# Patient Record
Sex: FEMALE | Race: WHITE | NOT HISPANIC OR LATINO | Employment: OTHER | URBAN - METROPOLITAN AREA
[De-identification: names, ages, dates, MRNs, and addresses within clinical notes are randomized per-mention and may not be internally consistent; named-entity substitution may affect disease eponyms.]

---

## 2023-06-16 ENCOUNTER — TELEPHONE (OUTPATIENT)
Dept: PRIMARY CARE | Facility: CLINIC | Age: 73
End: 2023-06-16
Payer: MEDICARE

## 2023-06-16 DIAGNOSIS — Z12.31 BREAST CANCER SCREENING BY MAMMOGRAM: Primary | ICD-10-CM

## 2023-11-15 ENCOUNTER — LAB (OUTPATIENT)
Dept: LAB | Facility: LAB | Age: 73
End: 2023-11-15
Payer: MEDICARE

## 2023-11-15 ENCOUNTER — OFFICE VISIT (OUTPATIENT)
Dept: PRIMARY CARE | Facility: CLINIC | Age: 73
End: 2023-11-15
Payer: MEDICARE

## 2023-11-15 VITALS
HEART RATE: 76 BPM | HEIGHT: 63 IN | DIASTOLIC BLOOD PRESSURE: 78 MMHG | SYSTOLIC BLOOD PRESSURE: 117 MMHG | WEIGHT: 160.8 LBS | BODY MASS INDEX: 28.49 KG/M2

## 2023-11-15 DIAGNOSIS — E78.5 DYSLIPIDEMIA: Primary | ICD-10-CM

## 2023-11-15 DIAGNOSIS — E55.9 VITAMIN D DEFICIENCY DISEASE: ICD-10-CM

## 2023-11-15 DIAGNOSIS — R73.9 BORDERLINE HYPERGLYCEMIA: ICD-10-CM

## 2023-11-15 DIAGNOSIS — E78.5 DYSLIPIDEMIA: ICD-10-CM

## 2023-11-15 DIAGNOSIS — R79.89 ABNORMAL TSH: ICD-10-CM

## 2023-11-15 LAB
25(OH)D3 SERPL-MCNC: 40 NG/ML (ref 30–100)
ALBUMIN SERPL BCP-MCNC: 4.7 G/DL (ref 3.4–5)
ALP SERPL-CCNC: 50 U/L (ref 33–136)
ALT SERPL W P-5'-P-CCNC: 28 U/L (ref 7–45)
ANION GAP SERPL CALC-SCNC: 14 MMOL/L (ref 10–20)
AST SERPL W P-5'-P-CCNC: 26 U/L (ref 9–39)
BASOPHILS # BLD AUTO: 0.07 X10*3/UL (ref 0–0.1)
BASOPHILS NFR BLD AUTO: 1.3 %
BILIRUB SERPL-MCNC: 1 MG/DL (ref 0–1.2)
BUN SERPL-MCNC: 19 MG/DL (ref 6–23)
CALCIUM SERPL-MCNC: 10.4 MG/DL (ref 8.6–10.6)
CHLORIDE SERPL-SCNC: 106 MMOL/L (ref 98–107)
CHOLEST SERPL-MCNC: 169 MG/DL (ref 0–199)
CHOLESTEROL/HDL RATIO: 2.8
CO2 SERPL-SCNC: 28 MMOL/L (ref 21–32)
CREAT SERPL-MCNC: 0.68 MG/DL (ref 0.5–1.05)
EOSINOPHIL # BLD AUTO: 0.12 X10*3/UL (ref 0–0.4)
EOSINOPHIL NFR BLD AUTO: 2.2 %
ERYTHROCYTE [DISTWIDTH] IN BLOOD BY AUTOMATED COUNT: 13.6 % (ref 11.5–14.5)
EST. AVERAGE GLUCOSE BLD GHB EST-MCNC: 114 MG/DL
GFR SERPL CREATININE-BSD FRML MDRD: >90 ML/MIN/1.73M*2
GLUCOSE SERPL-MCNC: 97 MG/DL (ref 74–99)
HBA1C MFR BLD: 5.6 %
HCT VFR BLD AUTO: 42.1 % (ref 36–46)
HDLC SERPL-MCNC: 60 MG/DL
HGB BLD-MCNC: 13.8 G/DL (ref 12–16)
IMM GRANULOCYTES # BLD AUTO: 0.02 X10*3/UL (ref 0–0.5)
IMM GRANULOCYTES NFR BLD AUTO: 0.4 % (ref 0–0.9)
LDLC SERPL CALC-MCNC: 92 MG/DL
LYMPHOCYTES # BLD AUTO: 1.46 X10*3/UL (ref 0.8–3)
LYMPHOCYTES NFR BLD AUTO: 27.2 %
MCH RBC QN AUTO: 29.2 PG (ref 26–34)
MCHC RBC AUTO-ENTMCNC: 32.8 G/DL (ref 32–36)
MCV RBC AUTO: 89 FL (ref 80–100)
MONOCYTES # BLD AUTO: 0.4 X10*3/UL (ref 0.05–0.8)
MONOCYTES NFR BLD AUTO: 7.4 %
NEUTROPHILS # BLD AUTO: 3.3 X10*3/UL (ref 1.6–5.5)
NEUTROPHILS NFR BLD AUTO: 61.5 %
NON HDL CHOLESTEROL: 109 MG/DL (ref 0–149)
NRBC BLD-RTO: 0 /100 WBCS (ref 0–0)
PLATELET # BLD AUTO: 282 X10*3/UL (ref 150–450)
POTASSIUM SERPL-SCNC: 4.7 MMOL/L (ref 3.5–5.3)
PROT SERPL-MCNC: 7.1 G/DL (ref 6.4–8.2)
RBC # BLD AUTO: 4.72 X10*6/UL (ref 4–5.2)
SODIUM SERPL-SCNC: 143 MMOL/L (ref 136–145)
TRIGL SERPL-MCNC: 83 MG/DL (ref 0–149)
TSH SERPL-ACNC: 3.22 MIU/L (ref 0.44–3.98)
VLDL: 17 MG/DL (ref 0–40)
WBC # BLD AUTO: 5.4 X10*3/UL (ref 4.4–11.3)

## 2023-11-15 PROCEDURE — 80061 LIPID PANEL: CPT

## 2023-11-15 PROCEDURE — 82306 VITAMIN D 25 HYDROXY: CPT

## 2023-11-15 PROCEDURE — 80053 COMPREHEN METABOLIC PANEL: CPT

## 2023-11-15 PROCEDURE — G0439 PPPS, SUBSEQ VISIT: HCPCS | Performed by: INTERNAL MEDICINE

## 2023-11-15 PROCEDURE — 1170F FXNL STATUS ASSESSED: CPT | Performed by: INTERNAL MEDICINE

## 2023-11-15 PROCEDURE — 84443 ASSAY THYROID STIM HORMONE: CPT

## 2023-11-15 PROCEDURE — 83036 HEMOGLOBIN GLYCOSYLATED A1C: CPT

## 2023-11-15 PROCEDURE — 85025 COMPLETE CBC W/AUTO DIFF WBC: CPT

## 2023-11-15 PROCEDURE — 36415 COLL VENOUS BLD VENIPUNCTURE: CPT

## 2023-11-15 RX ORDER — ATORVASTATIN CALCIUM 20 MG/1
20 TABLET, FILM COATED ORAL DAILY
COMMUNITY
End: 2023-11-15 | Stop reason: SDUPTHER

## 2023-11-15 RX ORDER — ATORVASTATIN CALCIUM 20 MG/1
20 TABLET, FILM COATED ORAL DAILY
Qty: 90 TABLET | Refills: 3 | Status: SHIPPED | OUTPATIENT
Start: 2023-11-15 | End: 2024-11-14

## 2023-11-15 ASSESSMENT — ACTIVITIES OF DAILY LIVING (ADL)
DRESSING: INDEPENDENT
GROCERY_SHOPPING: INDEPENDENT
TAKING_MEDICATION: INDEPENDENT
DOING_HOUSEWORK: INDEPENDENT
BATHING: INDEPENDENT
MANAGING_FINANCES: INDEPENDENT

## 2023-11-15 ASSESSMENT — PATIENT HEALTH QUESTIONNAIRE - PHQ9
1. LITTLE INTEREST OR PLEASURE IN DOING THINGS: NOT AT ALL
2. FEELING DOWN, DEPRESSED OR HOPELESS: NOT AT ALL
SUM OF ALL RESPONSES TO PHQ9 QUESTIONS 1 AND 2: 0

## 2023-11-15 ASSESSMENT — ENCOUNTER SYMPTOMS
LOSS OF SENSATION IN FEET: 0
DEPRESSION: 0
OCCASIONAL FEELINGS OF UNSTEADINESS: 0

## 2023-11-15 NOTE — PROGRESS NOTES
Bridget Palomares is a 74 yo F presenting for CPE/MCR wellness exam.     Brother passed from CRC in interval.       DLD - atorva 20   Osteopenia - Ca/D   Epiretinal membrane disease - FU opthal       #HM   -cscope 6.22 - due 6.25   -dexa 12.6.22 - due 12.24  -mammo 7.14.23  -vax UTD   -labs due       SH        Gen Aox3 NAD well appearing   HEENT mmm pharynx clear no cervical LAD   Eyes sclerae clear   CV rrr nl s1/2 no m/r/g  Pulm CTAB no adventitious sounds   Ext warm dry no edema 2+ DP pulse

## 2023-11-27 ENCOUNTER — APPOINTMENT (OUTPATIENT)
Dept: PRIMARY CARE | Facility: CLINIC | Age: 73
End: 2023-11-27
Payer: MEDICARE

## 2024-02-23 ASSESSMENT — DERMATOLOGY QUALITY OF LIFE (QOL) ASSESSMENT
RATE HOW EMOTIONALLY BOTHERED YOU ARE BY YOUR SKIN PROBLEM (FOR EXAMPLE, WORRY, EMBARRASSMENT, FRUSTRATION): 0 - NEVER BOTHERED
RATE HOW BOTHERED YOU ARE BY SYMPTOMS OF YOUR SKIN PROBLEM (EG, ITCHING, STINGING BURNING, HURTING OR SKIN IRRITATION): 1
RATE HOW EMOTIONALLY BOTHERED YOU ARE BY YOUR SKIN PROBLEM (FOR EXAMPLE, WORRY, EMBARRASSMENT, FRUSTRATION): 0 - NEVER BOTHERED
WHAT SINGLE SKIN CONDITION LISTED BELOW IS THE PATIENT ANSWERING THE QUALITY-OF-LIFE ASSESSMENT QUESTIONS ABOUT: NONE OF THE ABOVE
RATE HOW BOTHERED YOU ARE BY SYMPTOMS OF YOUR SKIN PROBLEM (EG, ITCHING, STINGING BURNING, HURTING OR SKIN IRRITATION): 1
RATE HOW BOTHERED YOU ARE BY EFFECTS OF YOUR SKIN PROBLEMS ON YOUR ACTIVITIES (EG, GOING OUT, ACCOMPLISHING WHAT YOU WANT, WORK ACTIVITIES OR YOUR RELATIONSHIPS WITH OTHERS): 0 - NEVER BOTHERED
WHAT SINGLE SKIN CONDITION LISTED BELOW IS THE PATIENT ANSWERING THE QUALITY-OF-LIFE ASSESSMENT QUESTIONS ABOUT: NONE OF THE ABOVE
RATE HOW BOTHERED YOU ARE BY EFFECTS OF YOUR SKIN PROBLEMS ON YOUR ACTIVITIES (EG, GOING OUT, ACCOMPLISHING WHAT YOU WANT, WORK ACTIVITIES OR YOUR RELATIONSHIPS WITH OTHERS): 0 - NEVER BOTHERED

## 2024-02-23 ASSESSMENT — PATIENT GLOBAL ASSESSMENT (PGA): WHAT IS THE PGA: PATIENT GLOBAL ASSESSMENT:  2 - MILD

## 2024-02-26 ENCOUNTER — OFFICE VISIT (OUTPATIENT)
Dept: DERMATOLOGY | Facility: CLINIC | Age: 74
End: 2024-02-26
Payer: MEDICARE

## 2024-02-26 DIAGNOSIS — L57.0 ACTINIC KERATOSIS: ICD-10-CM

## 2024-02-26 DIAGNOSIS — L66.1 LICHEN PLANOPILARIS: Primary | ICD-10-CM

## 2024-02-26 PROCEDURE — 1159F MED LIST DOCD IN RCRD: CPT | Performed by: DERMATOLOGY

## 2024-02-26 PROCEDURE — 99204 OFFICE O/P NEW MOD 45 MIN: CPT | Performed by: DERMATOLOGY

## 2024-02-26 RX ORDER — CLOBETASOL PROPIONATE 0.46 MG/ML
SOLUTION TOPICAL 2 TIMES DAILY
Qty: 50 ML | Refills: 3 | Status: SHIPPED | OUTPATIENT
Start: 2024-02-26 | End: 2024-03-11

## 2024-02-26 NOTE — PROGRESS NOTES
Subjective   Bridget Palomares is a 73 y.o. female who presents for the following: Suspicious Skin Lesion.  Skin Lesion  She describes it as a bump, that is located on her scalp.  It was first noticed 2 weeks ago. It has been causing itching and is not changing and bleeding. Previously this spot has never been  treated .  Other spots that are concerning: none    Patient states she has a history of Sks.      Objective   Well appearing patient in no apparent distress; mood and affect are within normal limits.    A focused examination was performed including head, including the scalp, face, neck, nose, ears, eyelids, and lips. All findings within normal limits unless otherwise noted below.    Mid Frontal Scalp  Patient has perifollicular erythema and scale along the hairline.       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 The nature of the diagnosis was explained to patient. Will plan to treat with clobetasol solution BID x 2-4 weeks; counseled patient if no improvement, can consider adding ILK injections. Risks, benefits, side effects, alternatives and options were discussed with patient and the patient voiced understanding. Pt agrees with plan as above.         Related Procedures  Follow Up In Dermatology - Established Patient    Related Medications  clobetasol (Temovate) 0.05 % external solution  Apply topically 2 times a day for 14 days.    Follow up as above.

## 2024-03-08 ENCOUNTER — OFFICE VISIT (OUTPATIENT)
Dept: OPHTHALMOLOGY | Facility: CLINIC | Age: 74
End: 2024-03-08
Payer: MEDICARE

## 2024-03-08 DIAGNOSIS — H35.373 BILATERAL EPIRETINAL MEMBRANE: Primary | ICD-10-CM

## 2024-03-08 DIAGNOSIS — H35.363 PERIPHERAL DRUSEN OF BOTH EYES: ICD-10-CM

## 2024-03-08 PROCEDURE — 92134 CPTRZ OPH DX IMG PST SGM RTA: CPT | Mod: BILATERAL PROCEDURE

## 2024-03-08 PROCEDURE — 99213 OFFICE O/P EST LOW 20 MIN: CPT

## 2024-03-08 RX ORDER — ASPIRIN 81 MG/1
1 TABLET ORAL DAILY
COMMUNITY
Start: 2014-08-08

## 2024-03-08 RX ORDER — ERGOCALCIFEROL 1.25 MG/1
CAPSULE ORAL
COMMUNITY
Start: 2014-05-05

## 2024-03-08 ASSESSMENT — ENCOUNTER SYMPTOMS
NEUROLOGICAL NEGATIVE: 0
RESPIRATORY NEGATIVE: 0
CONSTITUTIONAL NEGATIVE: 0
HEMATOLOGIC/LYMPHATIC NEGATIVE: 0
CARDIOVASCULAR NEGATIVE: 0
GASTROINTESTINAL NEGATIVE: 0
MUSCULOSKELETAL NEGATIVE: 0
ENDOCRINE NEGATIVE: 0
ALLERGIC/IMMUNOLOGIC NEGATIVE: 0
PSYCHIATRIC NEGATIVE: 0
EYES NEGATIVE: 1

## 2024-03-08 ASSESSMENT — EXTERNAL EXAM - LEFT EYE: OS_EXAM: NORMAL

## 2024-03-08 ASSESSMENT — CONF VISUAL FIELD
OD_SUPERIOR_TEMPORAL_RESTRICTION: 0
OD_SUPERIOR_NASAL_RESTRICTION: 0
OD_NORMAL: 1
OS_SUPERIOR_NASAL_RESTRICTION: 0
METHOD: COUNTING FINGERS
OS_SUPERIOR_TEMPORAL_RESTRICTION: 0
OD_INFERIOR_NASAL_RESTRICTION: 0
OS_NORMAL: 1
OS_INFERIOR_NASAL_RESTRICTION: 0
OS_INFERIOR_TEMPORAL_RESTRICTION: 0
OD_INFERIOR_TEMPORAL_RESTRICTION: 0

## 2024-03-08 ASSESSMENT — VISUAL ACUITY
CORRECTION_TYPE: GLASSES
OS_CC: 20/25
OS_CC+: +2
METHOD: SNELLEN - LINEAR
OD_CC: 20/20

## 2024-03-08 ASSESSMENT — REFRACTION_WEARINGRX
OD_ADD: +2.50
OD_SPHERE: +0.50
OD_CYLINDER: -1.50
OS_AXIS: 080
OS_SPHERE: PLANO
SPECS_TYPE: PAL
OS_CYLINDER: -1.75
OD_AXIS: 080
OS_ADD: +2.50

## 2024-03-08 ASSESSMENT — TONOMETRY
IOP_METHOD: GOLDMANN APPLANATION
OS_IOP_MMHG: 17
OD_IOP_MMHG: 17

## 2024-03-08 ASSESSMENT — CUP TO DISC RATIO
OD_RATIO: .3
OS_RATIO: .3

## 2024-03-08 ASSESSMENT — SLIT LAMP EXAM - LIDS
COMMENTS: GOOD POSITION
COMMENTS: GOOD POSITION

## 2024-03-08 ASSESSMENT — EXTERNAL EXAM - RIGHT EYE: OD_EXAM: NORMAL

## 2024-03-08 NOTE — PROGRESS NOTES
Epiretinal membrane (ERM) of both eyesH35.373    Here today for annual follow up. Patient is reporting mild metamorphopsia OS only but states that it is stable and not affecting her ADLs.    Today on exam patient has stable ERMs, (OS > OD). Vision remains excellent at 20/20 OD and 20/25 OS.    OCT : 03/08/24     OD: ERM, abnromal foveal contour, inner reitnal thickening, EZ line preserved, (-) IRF/subretinal fluid (SRF)  OS: ERM (OS > OD), abnromal foveal contour, inner reitnal thickening, EZ line preserved, (-) IRF/subretinal fluid (SRF)    Plan  Will monitor for  now, discussed the need for potential surgery in the future if vision declines or symptoms worsen  RTC 6 months      #Peripheral Drusen  - no evidence of cnvm, few central drusen but not apparent on OCT  - not consistent with AMD, will monitor for now

## 2024-03-13 ENCOUNTER — OFFICE VISIT (OUTPATIENT)
Dept: DERMATOLOGY | Facility: CLINIC | Age: 74
End: 2024-03-13
Payer: MEDICARE

## 2024-03-13 DIAGNOSIS — L66.1 LICHEN PLANOPILARIS: ICD-10-CM

## 2024-03-13 DIAGNOSIS — L66.1 FRONTAL FIBROSING ALOPECIA: Primary | ICD-10-CM

## 2024-03-13 PROCEDURE — 1159F MED LIST DOCD IN RCRD: CPT | Performed by: DERMATOLOGY

## 2024-03-13 PROCEDURE — 1036F TOBACCO NON-USER: CPT | Performed by: DERMATOLOGY

## 2024-03-13 PROCEDURE — 99213 OFFICE O/P EST LOW 20 MIN: CPT | Performed by: DERMATOLOGY

## 2024-03-13 ASSESSMENT — DERMATOLOGY QUALITY OF LIFE (QOL) ASSESSMENT
ARE THERE EXCLUSIONS OR EXCEPTIONS FOR THE QUALITY OF LIFE ASSESSMENT: NO
DATE THE QUALITY-OF-LIFE ASSESSMENT WAS COMPLETED: 66912
RATE HOW BOTHERED YOU ARE BY EFFECTS OF YOUR SKIN PROBLEMS ON YOUR ACTIVITIES (EG, GOING OUT, ACCOMPLISHING WHAT YOU WANT, WORK ACTIVITIES OR YOUR RELATIONSHIPS WITH OTHERS): 0 - NEVER BOTHERED
RATE HOW BOTHERED YOU ARE BY SYMPTOMS OF YOUR SKIN PROBLEM (EG, ITCHING, STINGING BURNING, HURTING OR SKIN IRRITATION): 0 - NEVER BOTHERED
WHAT SINGLE SKIN CONDITION LISTED BELOW IS THE PATIENT ANSWERING THE QUALITY-OF-LIFE ASSESSMENT QUESTIONS ABOUT: NONE OF THE ABOVE
RATE HOW EMOTIONALLY BOTHERED YOU ARE BY YOUR SKIN PROBLEM (FOR EXAMPLE, WORRY, EMBARRASSMENT, FRUSTRATION): 0 - NEVER BOTHERED

## 2024-03-13 ASSESSMENT — DERMATOLOGY PATIENT ASSESSMENT
DO YOU HAVE IRREGULAR MENSTRUAL CYCLES: NO
DO YOU USE SUNSCREEN: DAILY
ARE YOU ON BIRTH CONTROL: NO
WHERE ARE THESE NEW OR CHANGING LESIONS LOCATED: FOREHEAD
DO YOU HAVE ANY NEW OR CHANGING LESIONS: NO
ARE YOU AN ORGAN TRANSPLANT RECIPIENT: NO
HAVE YOU HAD OR DO YOU HAVE VASCULAR DISEASE: NO
ARE YOU TRYING TO GET PREGNANT: NO
HAVE YOU HAD OR DO YOU HAVE A STAPH INFECTION: NO
DO YOU USE A TANNING BED: NO

## 2024-03-13 ASSESSMENT — PATIENT GLOBAL ASSESSMENT (PGA): PATIENT GLOBAL ASSESSMENT: PATIENT GLOBAL ASSESSMENT:  1 - CLEAR

## 2024-03-13 ASSESSMENT — ITCH NUMERIC RATING SCALE: HOW SEVERE IS YOUR ITCHING?: 0

## 2024-03-13 NOTE — PROGRESS NOTES
Subjective   Bridget Palomares is a 73 y.o. female who presents for the following: Lichen Planopilaris.    Lichen Planopilaris/frontal fibrosing alopecia  Patient reports here for follow up on FFA on her mid frontal scalp. She has done the clobetasol twice daily for 2 weeks with resolution of her symptoms. She last used it on Monday. She denies itch, pain, burning of the scalp at this time. She has not noticed hair loss elsewhere      Objective   Well appearing patient in no apparent distress; mood and affect are within normal limits.    A focused examination was performed including scalp, face. All findings within normal limits unless otherwise noted below.    Scalp  Erythema and perifollicular scale, primarily in the frontal midline scalp; miniaturization is noted. There is scarring present in the frontal scalp.      Assessment/Plan   Frontal fibrosing alopecia  Scalp    Frontal fibrosing alopcia  - Diagnosis and treatment options reviewed including topical steroids, ILK were reviewed; HCQ, low-dose oral naltrexone, oral minoxidil, or oral anti-androgens could be considered in the future if not under adequate control  - This is a scarring alopecia, and the goal of treatment is to prevent further loss of hair. There is potential for some regrowth with treatment  - Recommend to continue clobetasol solution, decreasing use to once daily to affected areas frontal scalp for 3 months; risks and benefits of topical steroids including thinning of the skin, more prominent blood vessels reviewed    Related Procedures  Follow Up In Dermatology - Established Patient    Lichen planopilaris    Related Procedures  Follow Up In Dermatology - Established Patient    3/13/2024 11:01 AM  Raya Guillaume MD  Dermatology Resident, PGY-4     I saw and evaluated the patient. I personally obtained the key and critical portions of the history and physical exam or was physically present for key and critical portions performed by the  student/resident. I reviewed the student/resident's documentation and discussed the patient with the student/resident. I was present for the entirety of any procedure(s). I agree with the student/resident's medical decision making as documented in the note.

## 2024-06-20 ENCOUNTER — APPOINTMENT (OUTPATIENT)
Dept: DERMATOLOGY | Facility: CLINIC | Age: 74
End: 2024-06-20
Payer: MEDICARE

## 2024-06-20 DIAGNOSIS — L81.4 LENTIGO: ICD-10-CM

## 2024-06-20 DIAGNOSIS — D18.01 HEMANGIOMA OF SKIN: ICD-10-CM

## 2024-06-20 DIAGNOSIS — L82.1 SEBORRHEIC KERATOSIS: ICD-10-CM

## 2024-06-20 DIAGNOSIS — Z12.83 ENCOUNTER FOR SCREENING FOR MALIGNANT NEOPLASM OF SKIN: ICD-10-CM

## 2024-06-20 DIAGNOSIS — L66.1 FRONTAL FIBROSING ALOPECIA: Primary | ICD-10-CM

## 2024-06-20 DIAGNOSIS — D22.9 MELANOCYTIC NEVUS, UNSPECIFIED LOCATION: ICD-10-CM

## 2024-06-20 PROCEDURE — 1159F MED LIST DOCD IN RCRD: CPT | Performed by: DERMATOLOGY

## 2024-06-20 PROCEDURE — 1036F TOBACCO NON-USER: CPT | Performed by: DERMATOLOGY

## 2024-06-20 PROCEDURE — 99213 OFFICE O/P EST LOW 20 MIN: CPT | Performed by: DERMATOLOGY

## 2024-06-20 RX ORDER — CLOBETASOL PROPIONATE 0.46 MG/ML
SOLUTION TOPICAL DAILY
Qty: 50 ML | Refills: 11 | Status: SHIPPED | OUTPATIENT
Start: 2024-06-20 | End: 2024-07-04

## 2024-06-20 ASSESSMENT — DERMATOLOGY QUALITY OF LIFE (QOL) ASSESSMENT
DATE THE QUALITY-OF-LIFE ASSESSMENT WAS COMPLETED: 67011
WHAT SINGLE SKIN CONDITION LISTED BELOW IS THE PATIENT ANSWERING THE QUALITY-OF-LIFE ASSESSMENT QUESTIONS ABOUT: ALOPECIA
RATE HOW BOTHERED YOU ARE BY SYMPTOMS OF YOUR SKIN PROBLEM (EG, ITCHING, STINGING BURNING, HURTING OR SKIN IRRITATION): 0 - NEVER BOTHERED
RATE HOW EMOTIONALLY BOTHERED YOU ARE BY YOUR SKIN PROBLEM (FOR EXAMPLE, WORRY, EMBARRASSMENT, FRUSTRATION): 0 - NEVER BOTHERED
RATE HOW BOTHERED YOU ARE BY EFFECTS OF YOUR SKIN PROBLEMS ON YOUR ACTIVITIES (EG, GOING OUT, ACCOMPLISHING WHAT YOU WANT, WORK ACTIVITIES OR YOUR RELATIONSHIPS WITH OTHERS): 0 - NEVER BOTHERED
ARE THERE EXCLUSIONS OR EXCEPTIONS FOR THE QUALITY OF LIFE ASSESSMENT: NO

## 2024-06-20 ASSESSMENT — PATIENT GLOBAL ASSESSMENT (PGA): PATIENT GLOBAL ASSESSMENT: PATIENT GLOBAL ASSESSMENT:  2 - MILD

## 2024-06-20 ASSESSMENT — ITCH NUMERIC RATING SCALE: HOW SEVERE IS YOUR ITCHING?: 0

## 2024-06-20 ASSESSMENT — DERMATOLOGY PATIENT ASSESSMENT
HAVE YOU HAD OR DO YOU HAVE A STAPH INFECTION: NO
DO YOU USE A TANNING BED: NO
ARE YOU AN ORGAN TRANSPLANT RECIPIENT: NO
DO YOU USE SUNSCREEN: DAILY
DO YOU HAVE ANY NEW OR CHANGING LESIONS: NO
HAVE YOU HAD OR DO YOU HAVE VASCULAR DISEASE: NO
DO YOU HAVE IRREGULAR MENSTRUAL CYCLES: NO
ARE YOU ON BIRTH CONTROL: NO
ARE YOU TRYING TO GET PREGNANT: NO

## 2024-06-20 NOTE — PROGRESS NOTES
Subjective     Bridget Palomares is a 73 y.o. female who presents for the following: Skin Check and Frontal Fibrosing Alopecia.     Skin Cancer Screening  She has a history of moderate sun exposure. She is in the sun occasionally. She uses sunscreen occasionally. She reports no skin symptoms. Her moles are not changing.    Spots that concern her: None    Frontal Fibrosing Alopecia  Patient presents for follow up on FFA on her mid frontal scalp. She has been using clobetasol once daily to affected areas and reports good response. She did notice a small flare.    Review of Systems:  No other skin or systemic complaints other than what is documented elsewhere in the note.    The following portions of the chart were reviewed this encounter and updated as appropriate:       Skin Cancer History  No skin cancer on file.    Specialty Problems    None    Past Medical History:  Bridget Palomares  has a past medical history of Acne (as a teen), Acute pharyngitis, unspecified (07/06/2018), Acute pharyngitis, unspecified (07/06/2018), Acute pharyngitis, unspecified (07/06/2018), Acute pharyngitis, unspecified (07/06/2018), Benign neoplasm of colon, unspecified (07/06/2018), Benign neoplasm of colon, unspecified (07/06/2018), Combined forms of age-related cataract, left eye (07/06/2018), Combined forms of age-related cataract, right eye (07/06/2018), Cough, unspecified (07/06/2018), Cough, unspecified (07/06/2018), Disorder of the skin and subcutaneous tissue, unspecified (07/06/2018), Disorder of the skin and subcutaneous tissue, unspecified (07/06/2018), Encounter for immunization (07/06/2018), Encounter for immunization (07/06/2018), Encounter for screening for malignant neoplasm of cervix (07/06/2018), Encounter for screening for malignant neoplasm of cervix (07/06/2018), Encounter for screening for malignant neoplasm of colon (07/06/2018), Encounter for screening for malignant neoplasm of colon (07/06/2018), Encounter  for screening mammogram for malignant neoplasm of breast (07/06/2018), Encounter for screening mammogram for malignant neoplasm of breast (07/06/2018), Epiretinal membrane (ERM), bilateral, Hyperlipidemia, unspecified (07/06/2018), Hyperlipidemia, unspecified (07/06/2018), Malignant neoplasm of uterus, part unspecified (Multi), Other vitreous opacities, unspecified eye (09/23/2014), Pain in left shoulder (07/06/2018), Pain in left shoulder (07/06/2018), Personal history of other endocrine, nutritional and metabolic disease, Precordial pain (07/06/2018), Precordial pain (07/06/2018), PVD (posterior vitreous detachment), right eye, Unspecified disorder of refraction (07/06/2018), Unspecified injury of head, initial encounter (07/06/2018), Unspecified injury of head, initial encounter (07/06/2018), Vitamin D deficiency, unspecified (07/06/2018), Vitamin D deficiency, unspecified (07/06/2018), and Vitreous degeneration, right eye (07/06/2018).    Past Surgical History:  Bridget Paolmares  has a past surgical history that includes Other surgical history (09/23/2014); Hysterectomy (09/23/2014); Tonsillectomy (09/23/2014); Colonoscopy w/ polypectomy (09/12/2015); and Other surgical history (09/12/2015).    Family History:  Patient family history includes Acne in her brother; Arthritis in her father and mother; Cancer in her brother, mother, paternal grandfather, and paternal grandmother; Diabetes in her father and father's brother; Hypertension in her brother, father, and mother; Skin cancer in her brother and mother; Stroke in her brother, father, and paternal grandfather.         Objective   Well appearing patient in no apparent distress; mood and affect are within normal limits.    A full examination was performed including scalp, head, eyes, ears, nose, lips, neck, chest, axillae, abdomen, back, buttocks, bilateral upper extremities, bilateral lower extremities, hands, feet, fingers, toes, fingernails, and toenails.  All findings within normal limits unless otherwise noted below.    Assessment/Plan   1. Encounter for screening for malignant neoplasm of skin    2. Hemangioma of skin  Scattered cherry-red papule(s).    3. Melanocytic nevus, unspecified location  All nevi were symmetric brown macules without atypia on dermoscopy.     The ABCDEs of melanoma and warning signs of non-melanoma skin cancer were discussed with patient and patient expressed understanding. Sun protection and use of at least SPF 30 discussed with patient. Pt instructed to reapply every 2 hours.     4. Lentigo  Scattered tan macules in sun-exposed areas.    The ABCDEs of melanoma and warning signs of non-melanoma skin cancer were discussed with patient and patient expressed understanding. Sun protection and use of at least SPF 30 discussed with patient. Pt instructed to reapply every 2 hours.     5. Seborrheic keratosis  Stuck on verrucous, tan-brown papules and plaques.      6. Frontal fibrosing alopecia  Scalp  Patient is well controlled on exam today.     Plan to continue clobetasol and recheck in 3 months; pt to call if flaring and we will consider ILK injections. Risks, benefits, side effects, alternatives and options were discussed with patient and the patient voiced understanding. Pt agrees with plan as above.       Related Procedures  Follow Up In Dermatology - Established Patient    Related Medications  clobetasol (Temovate) 0.05 % external solution  Apply topically once daily for 14 days. Then continuously/as needed to scalp      Follow up 3-4 months or sooner as needed

## 2024-09-12 ENCOUNTER — APPOINTMENT (OUTPATIENT)
Dept: PODIATRY | Facility: CLINIC | Age: 74
End: 2024-09-12
Payer: MEDICARE

## 2024-09-12 DIAGNOSIS — L60.3 ONYCHODYSTROPHY: ICD-10-CM

## 2024-09-12 DIAGNOSIS — B35.1 ONYCHOMYCOSIS: Primary | ICD-10-CM

## 2024-09-12 PROCEDURE — 99203 OFFICE O/P NEW LOW 30 MIN: CPT | Performed by: PODIATRIST

## 2024-09-12 PROCEDURE — 1159F MED LIST DOCD IN RCRD: CPT | Performed by: PODIATRIST

## 2024-09-12 NOTE — PROGRESS NOTES
"History Of Present Illness  Bridget Palomares \"Bridget Abdi\" is a 74 y.o. female presenting with chief complaint of: transition of care for right great toenail problem.     PCP Helen Patel MD  To establish 10/2024     Past Medical History  She has a past medical history of Acne (as a teen), Acute pharyngitis, unspecified (07/06/2018), Acute pharyngitis, unspecified (07/06/2018), Acute pharyngitis, unspecified (07/06/2018), Acute pharyngitis, unspecified (07/06/2018), Benign neoplasm of colon, unspecified (07/06/2018), Benign neoplasm of colon, unspecified (07/06/2018), Combined forms of age-related cataract, left eye (07/06/2018), Combined forms of age-related cataract, right eye (07/06/2018), Cough, unspecified (07/06/2018), Cough, unspecified (07/06/2018), Disorder of the skin and subcutaneous tissue, unspecified (07/06/2018), Disorder of the skin and subcutaneous tissue, unspecified (07/06/2018), Encounter for immunization (07/06/2018), Encounter for immunization (07/06/2018), Encounter for screening for malignant neoplasm of cervix (07/06/2018), Encounter for screening for malignant neoplasm of cervix (07/06/2018), Encounter for screening for malignant neoplasm of colon (07/06/2018), Encounter for screening for malignant neoplasm of colon (07/06/2018), Encounter for screening mammogram for malignant neoplasm of breast (07/06/2018), Encounter for screening mammogram for malignant neoplasm of breast (07/06/2018), Epiretinal membrane (ERM), bilateral, Hyperlipidemia, unspecified (07/06/2018), Hyperlipidemia, unspecified (07/06/2018), Malignant neoplasm of uterus, part unspecified (Multi), Other vitreous opacities, unspecified eye (09/23/2014), Pain in left shoulder (07/06/2018), Pain in left shoulder (07/06/2018), Personal history of other endocrine, nutritional and metabolic disease, Precordial pain (07/06/2018), Precordial pain (07/06/2018), PVD (posterior vitreous detachment), right eye, Unspecified disorder of " refraction (07/06/2018), Unspecified injury of head, initial encounter (07/06/2018), Unspecified injury of head, initial encounter (07/06/2018), Vitamin D deficiency, unspecified (07/06/2018), Vitamin D deficiency, unspecified (07/06/2018), and Vitreous degeneration, right eye (07/06/2018).    Surgical History  She has a past surgical history that includes Other surgical history (09/23/2014); Hysterectomy (09/23/2014); Tonsillectomy (09/23/2014); Colonoscopy w/ polypectomy (09/12/2015); and Other surgical history (09/12/2015).     Social History  She reports that she has never smoked. She has never used smokeless tobacco. She reports current alcohol use of about 2.0 standard drinks of alcohol per week. She reports that she does not use drugs.    Family History  Family History   Problem Relation Name Age of Onset    Arthritis Mother Maru Villar     Cancer Mother Maru Villar     Hypertension Mother Maru Villar     Skin cancer Mother Maru Villar     Diabetes Father Joseph Villar     Arthritis Father Joseph Villar     Hypertension Father Joseph Villar     Stroke Father Joseph Villar     Acne Brother Jose Marimarychuy     Cancer Brother Jose Villar     Hypertension Brother Jose Villar     Stroke Brother Jose Villar     Skin cancer Brother Jose Villar     Diabetes Father's Brother Nate Villar     Cancer Paternal Grandmother Ana Villar     Cancer Paternal Grandfather Joseph Villar     Stroke Paternal Grandfather Joseph Villar         Allergies  Tomato    Medications  Current Outpatient Medications   Medication Sig Dispense Refill    aspirin 81 mg EC tablet Take 1 tablet (81 mg) by mouth once daily.      atorvastatin (Lipitor) 20 mg tablet Take 1 tablet (20 mg) by mouth once daily. 90 tablet 3    cholecalciferol, vitamin D3, (VITAMIN D3 ORAL) Take by mouth.      COQ10, UBIQUINOL, ORAL       multivit-mineral-iron-lutein tablet       omega-3 fatty acids (FISH OIL CONCENTRATE ORAL) Take by mouth.       No  current facility-administered medications for this visit.       Review of Systems    REVIEW OF SYSTEMS  GENERAL:  Negative for malaise, significant weight loss, fever  CARDIOVASCULAR: leg swelling   MUSCULOSKELETAL:  Negative for joint pain or swelling, back pain, and muscle pain.  SKIN:  Negative for lesions, rash, and itching  PSYCH:  Negative for sleep disturbance, mood disorder and recent psychosocial stressors  NEURO: Negative, denies any burning, tingling or numbness     Objective:   Vasc: DP and PT pulses are palpable bilateral.  CFT is less than 3 seconds bilateral.  Skin temperature is warm to cool proximal to distal bilateral.      Neuro:  Light touch is intact to the foot bilateral.  There is no clonus noted.  The hallux is downgoing bilateral.      Derm: First toenail left is completely dystrophic thickened with subungual debris.  Skin is supple with normal texture and turgor noted.  Webspaces are clean, dry and intact bilateral.  There are no hyperkeratoses, ulcerations, verruca or other lesions noted.      Ortho: Muscle strength is 5/5 for all pedal groups tested.  Ankle joint, subtalar joint, 1st MPJ and lesser MPJ ROM is full and without pain or crepitus.  The foot type is rectus bilateral off weight bearing.  There are no structural deformities noted.    Assessment/Plan     Diagnoses and all orders for this visit:  Onychomycosis  Onychodystrophy    Nail was aggressively debrided.  Warned patient that she may never have a normal-looking nail.  At this time she can do Kerasal to soften nail and perhaps protect new nail growth.

## 2024-09-13 ENCOUNTER — APPOINTMENT (OUTPATIENT)
Dept: OPHTHALMOLOGY | Facility: CLINIC | Age: 74
End: 2024-09-13
Payer: MEDICARE

## 2024-09-13 DIAGNOSIS — H35.373 BILATERAL EPIRETINAL MEMBRANE: Primary | ICD-10-CM

## 2024-09-13 DIAGNOSIS — H35.363 PERIPHERAL DRUSEN OF BOTH EYES: ICD-10-CM

## 2024-09-13 ASSESSMENT — ENCOUNTER SYMPTOMS
GASTROINTESTINAL NEGATIVE: 0
EYES NEGATIVE: 0
ALLERGIC/IMMUNOLOGIC NEGATIVE: 0
HEMATOLOGIC/LYMPHATIC NEGATIVE: 0
NEUROLOGICAL NEGATIVE: 0
CARDIOVASCULAR NEGATIVE: 0
MUSCULOSKELETAL NEGATIVE: 0
ENDOCRINE NEGATIVE: 0
PSYCHIATRIC NEGATIVE: 0
RESPIRATORY NEGATIVE: 0
CONSTITUTIONAL NEGATIVE: 0

## 2024-09-13 ASSESSMENT — REFRACTION_WEARINGRX
OS_CYLINDER: -1.75
OS_ADD: +2.50
OS_SPHERE: PLANO
OD_AXIS: 080
OD_SPHERE: +0.50
OD_CYLINDER: -1.50
OD_ADD: +2.50
OS_AXIS: 080
SPECS_TYPE: PAL

## 2024-09-13 ASSESSMENT — EXTERNAL EXAM - LEFT EYE: OS_EXAM: NORMAL

## 2024-09-13 ASSESSMENT — TONOMETRY
IOP_METHOD: GOLDMANN APPLANATION
OD_IOP_MMHG: 16
OS_IOP_MMHG: 16

## 2024-09-13 ASSESSMENT — VISUAL ACUITY
METHOD: SNELLEN - LINEAR
OS_CC: 20/25+2
CORRECTION_TYPE: GLASSES
OD_CC: 20/20

## 2024-09-13 ASSESSMENT — SLIT LAMP EXAM - LIDS
COMMENTS: GOOD POSITION
COMMENTS: GOOD POSITION

## 2024-09-13 ASSESSMENT — CUP TO DISC RATIO
OD_RATIO: .3
OS_RATIO: .3

## 2024-09-13 ASSESSMENT — EXTERNAL EXAM - RIGHT EYE: OD_EXAM: NORMAL

## 2024-09-13 NOTE — PROGRESS NOTES
Epiretinal membrane (ERM) of both eyesH35.373  - Here today for annual follow up. Patient is reporting mild metamorphopsia OS only but states that it is stable and not affecting her ADLs.    - Today on exam patient has stable ERMs, (OS > OD). Vision remains excellent at 20/20 OD and 20/25 OS.    OCT : 09/13/24   - OD: ERM, abnromal foveal contour, inner reitnal thickening, EZ line preserved, (-) IRF/subretinal fluid (SRF); stable   - OS: ERM (OS > OD), abnromal foveal contour, inner reitnal thickening, EZ line preserved, (-) IRF/subretinal fluid (SRF): stable    Plan  - Will monitor for  now, discussed the need for potential surgery in the future if vision declines or symptoms worsen  - RTC 6 months      #Peripheral Drusen  - no evidence of cnvm, few central drusen but not apparent on OCT  - not consistent with AMD, will monitor for now

## 2024-10-03 ENCOUNTER — APPOINTMENT (OUTPATIENT)
Dept: PRIMARY CARE | Facility: CLINIC | Age: 74
End: 2024-10-03
Payer: MEDICARE

## 2024-10-03 VITALS
OXYGEN SATURATION: 99 % | WEIGHT: 155.8 LBS | HEART RATE: 70 BPM | DIASTOLIC BLOOD PRESSURE: 76 MMHG | BODY MASS INDEX: 27.61 KG/M2 | HEIGHT: 63 IN | SYSTOLIC BLOOD PRESSURE: 114 MMHG

## 2024-10-03 DIAGNOSIS — C55 MALIGNANT NEOPLASM OF UTERUS, UNSPECIFIED SITE (MULTI): ICD-10-CM

## 2024-10-03 DIAGNOSIS — Z80.0 FAMILY HISTORY OF COLON CANCER: ICD-10-CM

## 2024-10-03 DIAGNOSIS — Z00.00 HEALTH CARE MAINTENANCE: ICD-10-CM

## 2024-10-03 DIAGNOSIS — Z76.89 ENCOUNTER TO ESTABLISH CARE: Primary | ICD-10-CM

## 2024-10-03 DIAGNOSIS — M85.80 OSTEOPENIA, UNSPECIFIED LOCATION: ICD-10-CM

## 2024-10-03 DIAGNOSIS — Z78.0 POST-MENOPAUSAL: ICD-10-CM

## 2024-10-03 DIAGNOSIS — Z12.31 ENCOUNTER FOR SCREENING MAMMOGRAM FOR MALIGNANT NEOPLASM OF BREAST: ICD-10-CM

## 2024-10-03 DIAGNOSIS — E55.9 VITAMIN D DEFICIENCY: ICD-10-CM

## 2024-10-03 DIAGNOSIS — E78.5 HYPERLIPIDEMIA, UNSPECIFIED HYPERLIPIDEMIA TYPE: ICD-10-CM

## 2024-10-03 PROCEDURE — 1159F MED LIST DOCD IN RCRD: CPT | Performed by: INTERNAL MEDICINE

## 2024-10-03 PROCEDURE — 99214 OFFICE O/P EST MOD 30 MIN: CPT | Performed by: INTERNAL MEDICINE

## 2024-10-03 PROCEDURE — 3008F BODY MASS INDEX DOCD: CPT | Performed by: INTERNAL MEDICINE

## 2024-10-03 RX ORDER — CLOBETASOL PROPIONATE 0.5 MG/ML
SOLUTION TOPICAL 2 TIMES DAILY
COMMUNITY

## 2024-10-03 ASSESSMENT — PATIENT HEALTH QUESTIONNAIRE - PHQ9
2. FEELING DOWN, DEPRESSED OR HOPELESS: NOT AT ALL
SUM OF ALL RESPONSES TO PHQ9 QUESTIONS 1 AND 2: 0
1. LITTLE INTEREST OR PLEASURE IN DOING THINGS: NOT AT ALL

## 2024-10-03 NOTE — PROGRESS NOTES
"Subjective   Patient ID: [id number]     Bridget Palomares \"Bridget Abdi\" is a 74 y.o. female who presents for New Patient Visit.    Current Outpatient Medications:     aspirin 81 mg EC tablet, Take 1 tablet (81 mg) by mouth once daily., Disp: , Rfl:     atorvastatin (Lipitor) 20 mg tablet, Take 1 tablet (20 mg) by mouth once daily., Disp: 90 tablet, Rfl: 3    cholecalciferol, vitamin D3, (VITAMIN D3 ORAL), Take by mouth., Disp: , Rfl:     clobetasol (Temovate) 0.05 % external solution, Apply topically 2 times a day., Disp: , Rfl:     COQ10, UBIQUINOL, ORAL, , Disp: , Rfl:     multivit-mineral-iron-lutein tablet, , Disp: , Rfl:     omega-3 fatty acids (FISH OIL CONCENTRATE ORAL), Take by mouth., Disp: , Rfl:   HPI  74-year-old patient presented to the office today to establish care.  Patient is due for her Medicare wellness and her annual exam next month.  She is known to have history of uterine cancer and history of hyperlipidemia.  She is doing great she has no specific concerns or complaints today she just wants to get the blood work requested and to get orders placed for mammogram and DEXA scan.  Review of system was reviewed all normal except what is noted in HPI   Past Medical History:   Diagnosis Date    Acne as a teen    Acute pharyngitis, unspecified 07/06/2018    Pharyngitis    Acute pharyngitis, unspecified 07/06/2018    Sore throat    Acute pharyngitis, unspecified 07/06/2018    Sore throat    Acute pharyngitis, unspecified 07/06/2018    Pharyngitis    Benign neoplasm of colon, unspecified 07/06/2018    Tubular adenoma of colon    Benign neoplasm of colon, unspecified 07/06/2018    Tubular adenoma of colon    Combined forms of age-related cataract, left eye 07/06/2018    Combined form of age-related cataract, left eye    Combined forms of age-related cataract, right eye 07/06/2018    Combined form of age-related cataract, right eye    Cough, unspecified 07/06/2018    Cough    Cough, unspecified 07/06/2018    " Cough    Disorder of the skin and subcutaneous tissue, unspecified 07/06/2018    Changing skin lesion    Disorder of the skin and subcutaneous tissue, unspecified 07/06/2018    Changing skin lesion    Encounter for immunization 07/06/2018    Need for pneumococcal vaccination    Encounter for immunization 07/06/2018    Need for pneumococcal vaccination    Encounter for screening for malignant neoplasm of cervix 07/06/2018    Cervical cancer screening    Encounter for screening for malignant neoplasm of cervix 07/06/2018    Cervical cancer screening    Encounter for screening for malignant neoplasm of colon 07/06/2018    Colon cancer screening    Encounter for screening for malignant neoplasm of colon 07/06/2018    Colon cancer screening    Encounter for screening mammogram for malignant neoplasm of breast 07/06/2018    Visit for screening mammogram    Encounter for screening mammogram for malignant neoplasm of breast 07/06/2018    Visit for screening mammogram    Epiretinal membrane (ERM), bilateral     Hyperlipidemia, unspecified 07/06/2018    Hyperlipidemia    Hyperlipidemia, unspecified 07/06/2018    Hyperlipidemia    Malignant neoplasm of uterus, part unspecified (Multi)     Mucinous adenocarcinoma of uterus    Other vitreous opacities, unspecified eye 09/23/2014    Floaters    Pain in left shoulder 07/06/2018    Left shoulder pain    Pain in left shoulder 07/06/2018    Left shoulder pain    Personal history of other endocrine, nutritional and metabolic disease     History of hypercholesterolemia    Precordial pain 07/06/2018    Chest pain, precordial    Precordial pain 07/06/2018    Chest pain, precordial    PVD (posterior vitreous detachment), right eye     Unspecified disorder of refraction 07/06/2018    Refraction error    Unspecified injury of head, initial encounter 07/06/2018    Acute head injury, initial encounter    Unspecified injury of head, initial encounter 07/06/2018    Acute head injury, initial  "encounter    Vitamin D deficiency, unspecified 07/06/2018    Vitamin D deficiency    Vitamin D deficiency, unspecified 07/06/2018    Vitamin D deficiency    Vitreous degeneration, right eye 07/06/2018    PVD (posterior vitreous detachment), right eye      Objective   /76 (BP Location: Left arm, Patient Position: Sitting, BP Cuff Size: Adult)   Pulse 70   Ht 1.588 m (5' 2.5\")   Wt 70.7 kg (155 lb 12.8 oz)   SpO2 99%   BMI 28.04 kg/m²      Physical Exam  Constitutional:       Appearance: Normal appearance.   Cardiovascular:      Rate and Rhythm: Normal rate and regular rhythm.      Pulses: Normal pulses.      Heart sounds: Normal heart sounds.   Pulmonary:      Effort: Pulmonary effort is normal.      Breath sounds: Normal breath sounds.   Neurological:      Mental Status: She is alert.         Assessment/Plan   Problem List Items Addressed This Visit    None  Visit Diagnoses       Encounter for screening mammogram for malignant neoplasm of breast            74-year-old patient with the following issues.    1.  Hyperlipidemia on atorvastatin lipid profile results are pending dose adjustment will take place if needed.    2.  Lab work was requested to prepare the patient for her annual exam and Medicare wellness as well as mammogram and DEXA scan.    No other active issues or concerns during this visit I will see the patient back in the office next month and sooner if needed.    Helen Morse MD   "

## 2024-10-09 ENCOUNTER — APPOINTMENT (OUTPATIENT)
Dept: DERMATOLOGY | Facility: CLINIC | Age: 74
End: 2024-10-09
Payer: MEDICARE

## 2024-10-09 DIAGNOSIS — L66.12 FRONTAL FIBROSING ALOPECIA: Primary | ICD-10-CM

## 2024-10-09 PROCEDURE — 1159F MED LIST DOCD IN RCRD: CPT | Performed by: DERMATOLOGY

## 2024-10-09 PROCEDURE — 1036F TOBACCO NON-USER: CPT | Performed by: DERMATOLOGY

## 2024-10-09 PROCEDURE — 99213 OFFICE O/P EST LOW 20 MIN: CPT | Performed by: DERMATOLOGY

## 2024-10-09 NOTE — PROGRESS NOTES
Subjective     Bridget Palomares is a 74 y.o. 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 Currently using Clobetasol Solution. ).     Alopecia  She complains of hair loss. The hair loss is localized in distribution, with onset approximately 1 year ago. She describes symptoms of hair thinning. She denies hair breaking, scalp itch, scalp pain, scalp rash/ lesions, scalp redness, scalp scaling, and scalp tenderness. She does not have family history of hair loss. She does not have dietary restrictions. She does not wear a high tension hair style. She had no serious medical illnesses or major weight loss during time of hair loss.         Review of Systems:  No other skin or systemic complaints other than what is documented elsewhere in the note.    The following portions of the chart were reviewed this encounter and updated as appropriate:       Skin Cancer History  No skin cancer on file.    Specialty Problems    None    Past Medical History:  Bridget Palomares  has a past medical history of Acne (as a teen), Acute pharyngitis, unspecified (07/06/2018), Acute pharyngitis, unspecified (07/06/2018), Acute pharyngitis, unspecified (07/06/2018), Acute pharyngitis, unspecified (07/06/2018), Benign neoplasm of colon, unspecified (07/06/2018), Benign neoplasm of colon, unspecified (07/06/2018), Combined forms of age-related cataract, left eye (07/06/2018), Combined forms of age-related cataract, right eye (07/06/2018), Cough, unspecified (07/06/2018), Cough, unspecified (07/06/2018), Disorder of the skin and subcutaneous tissue, unspecified (07/06/2018), Disorder of the skin and subcutaneous tissue, unspecified (07/06/2018), Encounter for immunization (07/06/2018), Encounter for immunization (07/06/2018), Encounter for screening for malignant neoplasm of cervix (07/06/2018), Encounter for screening for malignant neoplasm of cervix  (07/06/2018), Encounter for screening for malignant neoplasm of colon (07/06/2018), Encounter for screening for malignant neoplasm of colon (07/06/2018), Encounter for screening mammogram for malignant neoplasm of breast (07/06/2018), Encounter for screening mammogram for malignant neoplasm of breast (07/06/2018), Epiretinal membrane (ERM), bilateral, Hyperlipidemia, unspecified (07/06/2018), Hyperlipidemia, unspecified (07/06/2018), Malignant neoplasm of uterus, part unspecified (Multi), Other vitreous opacities, unspecified eye (09/23/2014), Pain in left shoulder (07/06/2018), Pain in left shoulder (07/06/2018), Personal history of other endocrine, nutritional and metabolic disease, Precordial pain (07/06/2018), Precordial pain (07/06/2018), PVD (posterior vitreous detachment), right eye, Unspecified disorder of refraction (07/06/2018), Unspecified injury of head, initial encounter (07/06/2018), Unspecified injury of head, initial encounter (07/06/2018), Vitamin D deficiency, unspecified (07/06/2018), Vitamin D deficiency, unspecified (07/06/2018), and Vitreous degeneration, right eye (07/06/2018).    Past Surgical History:  Bridget Palomares  has a past surgical history that includes Other surgical history (09/23/2014); Hysterectomy (09/23/2014); Tonsillectomy (09/23/2014); Colonoscopy w/ polypectomy (09/12/2015); and Other surgical history (09/12/2015).    Family History:  Patient family history includes Acne in her brother; Arthritis in her father and mother; Cancer in her brother, mother, paternal grandfather, and paternal grandmother; Diabetes in her father and father's brother; Hypertension in her brother, father, and mother; Skin cancer in her brother and mother; Stroke in her brother, father, and paternal grandfather.       Objective   Well appearing patient in no apparent distress; mood and affect are within normal limits.    A focused skin examination was performed. All findings within normal limits  unless otherwise noted below.    Assessment/Plan   1. Frontal fibrosing alopecia  Scalp  Patient has slight flare on L frontal scalp and R posterior scalp but has not been using clobetasol to those areas/feels that areas are still responsive.     Plan to continue clobetasol to these bassam BID x 1-2 weeks then PRN and recheck in 6 months + FBSE; pt to call if flaring and we will consider ILK injections. Risks, benefits, side effects, alternatives and options were discussed with patient and the patient voiced understanding. Pt agrees with plan as above.       Follow up 6 months or sooner as needed.

## 2024-10-24 ENCOUNTER — HOSPITAL ENCOUNTER (OUTPATIENT)
Dept: RADIOLOGY | Facility: CLINIC | Age: 74
Discharge: HOME | End: 2024-10-24
Payer: MEDICARE

## 2024-10-24 VITALS — BODY MASS INDEX: 27.62 KG/M2 | HEIGHT: 63 IN | WEIGHT: 155.87 LBS

## 2024-10-24 DIAGNOSIS — Z12.31 ENCOUNTER FOR SCREENING MAMMOGRAM FOR MALIGNANT NEOPLASM OF BREAST: ICD-10-CM

## 2024-10-24 PROCEDURE — 77067 SCR MAMMO BI INCL CAD: CPT

## 2024-11-04 DIAGNOSIS — E78.5 DYSLIPIDEMIA: ICD-10-CM

## 2024-11-05 RX ORDER — ATORVASTATIN CALCIUM 20 MG/1
20 TABLET, FILM COATED ORAL DAILY
Qty: 90 TABLET | Refills: 0 | Status: SHIPPED | OUTPATIENT
Start: 2024-11-05

## 2024-11-07 ENCOUNTER — APPOINTMENT (OUTPATIENT)
Dept: PODIATRY | Facility: CLINIC | Age: 74
End: 2024-11-07
Payer: MEDICARE

## 2024-11-15 ENCOUNTER — LAB (OUTPATIENT)
Dept: LAB | Facility: LAB | Age: 74
End: 2024-11-15
Payer: MEDICARE

## 2024-11-15 DIAGNOSIS — E55.9 VITAMIN D DEFICIENCY: ICD-10-CM

## 2024-11-15 DIAGNOSIS — Z00.00 HEALTH CARE MAINTENANCE: ICD-10-CM

## 2024-11-15 DIAGNOSIS — C55 MALIGNANT NEOPLASM OF UTERUS, UNSPECIFIED SITE (MULTI): ICD-10-CM

## 2024-11-15 DIAGNOSIS — E78.5 HYPERLIPIDEMIA, UNSPECIFIED HYPERLIPIDEMIA TYPE: ICD-10-CM

## 2024-11-15 LAB
25(OH)D3 SERPL-MCNC: 43 NG/ML (ref 30–100)
ALBUMIN SERPL BCP-MCNC: 4.7 G/DL (ref 3.4–5)
ALP SERPL-CCNC: 56 U/L (ref 33–136)
ALT SERPL W P-5'-P-CCNC: 21 U/L (ref 7–45)
ANION GAP SERPL CALC-SCNC: 15 MMOL/L (ref 10–20)
AST SERPL W P-5'-P-CCNC: 22 U/L (ref 9–39)
BILIRUB SERPL-MCNC: 1.1 MG/DL (ref 0–1.2)
BUN SERPL-MCNC: 23 MG/DL (ref 6–23)
CALCIUM SERPL-MCNC: 10.3 MG/DL (ref 8.6–10.6)
CHLORIDE SERPL-SCNC: 103 MMOL/L (ref 98–107)
CHOLEST SERPL-MCNC: 158 MG/DL (ref 0–199)
CHOLESTEROL/HDL RATIO: 2.2
CO2 SERPL-SCNC: 27 MMOL/L (ref 21–32)
CREAT SERPL-MCNC: 0.75 MG/DL (ref 0.5–1.05)
EGFRCR SERPLBLD CKD-EPI 2021: 85 ML/MIN/1.73M*2
ERYTHROCYTE [DISTWIDTH] IN BLOOD BY AUTOMATED COUNT: 13.8 % (ref 11.5–14.5)
GLUCOSE SERPL-MCNC: 96 MG/DL (ref 74–99)
HCT VFR BLD AUTO: 43.2 % (ref 36–46)
HDLC SERPL-MCNC: 73.1 MG/DL
HGB BLD-MCNC: 13.9 G/DL (ref 12–16)
LDLC SERPL CALC-MCNC: 73 MG/DL
MCH RBC QN AUTO: 29.6 PG (ref 26–34)
MCHC RBC AUTO-ENTMCNC: 32.2 G/DL (ref 32–36)
MCV RBC AUTO: 92 FL (ref 80–100)
NON HDL CHOLESTEROL: 85 MG/DL (ref 0–149)
NRBC BLD-RTO: 0 /100 WBCS (ref 0–0)
PLATELET # BLD AUTO: 222 X10*3/UL (ref 150–450)
POTASSIUM SERPL-SCNC: 4.2 MMOL/L (ref 3.5–5.3)
PROT SERPL-MCNC: 7.2 G/DL (ref 6.4–8.2)
RBC # BLD AUTO: 4.69 X10*6/UL (ref 4–5.2)
SODIUM SERPL-SCNC: 141 MMOL/L (ref 136–145)
T4 FREE SERPL-MCNC: 0.88 NG/DL (ref 0.78–1.48)
TRIGL SERPL-MCNC: 61 MG/DL (ref 0–149)
TSH SERPL-ACNC: 4.06 MIU/L (ref 0.44–3.98)
VLDL: 12 MG/DL (ref 0–40)
WBC # BLD AUTO: 5 X10*3/UL (ref 4.4–11.3)

## 2024-11-15 PROCEDURE — 82306 VITAMIN D 25 HYDROXY: CPT

## 2024-11-15 PROCEDURE — 36415 COLL VENOUS BLD VENIPUNCTURE: CPT

## 2024-11-15 PROCEDURE — 80053 COMPREHEN METABOLIC PANEL: CPT

## 2024-11-15 PROCEDURE — 84439 ASSAY OF FREE THYROXINE: CPT

## 2024-11-15 PROCEDURE — 85027 COMPLETE CBC AUTOMATED: CPT

## 2024-11-15 PROCEDURE — 80061 LIPID PANEL: CPT

## 2024-11-15 PROCEDURE — 84443 ASSAY THYROID STIM HORMONE: CPT

## 2024-11-19 ENCOUNTER — APPOINTMENT (OUTPATIENT)
Dept: PRIMARY CARE | Facility: CLINIC | Age: 74
End: 2024-11-19
Payer: MEDICARE

## 2024-11-19 VITALS
DIASTOLIC BLOOD PRESSURE: 82 MMHG | HEIGHT: 63 IN | HEART RATE: 69 BPM | OXYGEN SATURATION: 97 % | BODY MASS INDEX: 28.03 KG/M2 | SYSTOLIC BLOOD PRESSURE: 120 MMHG | WEIGHT: 158.2 LBS

## 2024-11-19 DIAGNOSIS — Z00.00 ROUTINE HEALTH MAINTENANCE: ICD-10-CM

## 2024-11-19 PROCEDURE — 1158F ADVNC CARE PLAN TLK DOCD: CPT | Performed by: INTERNAL MEDICINE

## 2024-11-19 PROCEDURE — 93000 ELECTROCARDIOGRAM COMPLETE: CPT | Performed by: INTERNAL MEDICINE

## 2024-11-19 PROCEDURE — 99397 PER PM REEVAL EST PAT 65+ YR: CPT | Performed by: INTERNAL MEDICINE

## 2024-11-19 PROCEDURE — 1159F MED LIST DOCD IN RCRD: CPT | Performed by: INTERNAL MEDICINE

## 2024-11-19 PROCEDURE — 1036F TOBACCO NON-USER: CPT | Performed by: INTERNAL MEDICINE

## 2024-11-19 PROCEDURE — G0444 DEPRESSION SCREEN ANNUAL: HCPCS | Performed by: INTERNAL MEDICINE

## 2024-11-19 PROCEDURE — 1170F FXNL STATUS ASSESSED: CPT | Performed by: INTERNAL MEDICINE

## 2024-11-19 PROCEDURE — G0439 PPPS, SUBSEQ VISIT: HCPCS | Performed by: INTERNAL MEDICINE

## 2024-11-19 PROCEDURE — 1123F ACP DISCUSS/DSCN MKR DOCD: CPT | Performed by: INTERNAL MEDICINE

## 2024-11-19 PROCEDURE — 3008F BODY MASS INDEX DOCD: CPT | Performed by: INTERNAL MEDICINE

## 2024-11-19 ASSESSMENT — ACTIVITIES OF DAILY LIVING (ADL)
BATHING: INDEPENDENT
TAKING_MEDICATION: INDEPENDENT
MANAGING_FINANCES: INDEPENDENT
DOING_HOUSEWORK: INDEPENDENT
DRESSING: INDEPENDENT
GROCERY_SHOPPING: INDEPENDENT

## 2024-11-19 ASSESSMENT — PATIENT HEALTH QUESTIONNAIRE - PHQ9
2. FEELING DOWN, DEPRESSED OR HOPELESS: NOT AT ALL
1. LITTLE INTEREST OR PLEASURE IN DOING THINGS: NOT AT ALL
SUM OF ALL RESPONSES TO PHQ9 QUESTIONS 1 AND 2: 0

## 2024-11-19 NOTE — PROGRESS NOTES
"Annual Medicare Wellness Exam/Comprehensive Problem Focused Follow Up  HPI/CC  Chief Complaint   Patient presents with    Medicare Annual Wellness Visit Subsequent     74-year-old patient presented to the office today for her annual exam and Medicare wellness visit.  Patient is doing great has no specific complaints she denies chest pain and shortness of breath not even on exertion she denies abdominal pain nausea vomiting changes in the bowel movements or blood in the stool she denies urine symptoms her appetite is good her energy level is adequate.  Assessment and Plan:  Problem List Items Addressed This Visit    None  Visit Diagnoses       Routine health maintenance        Relevant Orders    ECG 12 lead (Clinic Performed)          74-year-old patient with the following issues.    1.  Hyperlipidemia lipid profile is excellent continue the current treatment no changes liver function test is normal.    2.  Healthcare maintenance issues, patient up-to-date on her mammogram bone density is scheduled for December colonoscopy is up-to-date.  Vaccines are up-to-date.    Medicare wellness visit was completed with its requirements no evidence of depression or alcohol concerns.    Disposition I will see the patient back in the office in 1 year for repeat physical and sooner if needed.  ROS otherwise negative aside from what was mentioned above in HPI.    Vitals  /82 (BP Location: Left arm, Patient Position: Sitting, BP Cuff Size: Adult)   Pulse 69   Ht 1.588 m (5' 2.5\")   Wt 71.8 kg (158 lb 3.2 oz)   SpO2 97%   BMI 28.47 kg/m²   Body mass index is 28.47 kg/m².  Physical Exam  Gen: Alert, NAD  HEENT:  Unremarkable  Neck:  No LUIS  Respiratory:  Lungs CTAB  Cardiovascular:  Heart RRR  Neuro:  Gross motor and sensory intact  Skin:  No suspicious lesions present  Breast: No masses, or axillary lymphadenopathy      Patient Care Team:  Helen Morse MD as PCP - General (Internal Medicine)  Jeanette LORENZO" MD Luann as PCP - [facility] Attributed Provider       Health Risk Assessment:  Patient was asked if he/she has any difficulty performing the following activities of daily living:  Preparing nutritious food and eating? No  Grocery shopping? No  Bathing and grooming yourself? No  Getting dressed? No  Using the toilet?No  Using the phone? No  Moving around from place to place (physical ambulation)? No  Doing housework (including laundry) independently? No  Managing finances independently? No  Managing medications independently? No  Doing housework (including laundry) independently? No    Patient was asked if he/she:  Feels safe in current home environment?: Yes  Uses seatbelt? Yes  Sees the dentist regularly? Yes  Exercises regularly: Yes  Suffers from depression, stress, anger, loneliness or social isolation, pain, suicidality? No    Dietary issues discussed: Yes  Cognitive Impairment No  Hearing difficulties: No  Visual Acuity assessed: Yes    What is your self-assessment of overall health status and life satisfaction? Good     5 minutes spent on SDOH screening:   Specifically Housing, Food Insecurity, Utilities and Transportatin Needs were evaluated   (See Screenings in Rooming section for documentation)  Food Insecurity: Not on file     Housing Stability: Not on file     Transportation Needs: Not on file       Allergies and Medications  Tomato  Current Outpatient Medications   Medication Instructions    aspirin 81 mg EC tablet 1 tablet, Daily    atorvastatin (LIPITOR) 20 mg, oral, Daily    cholecalciferol, vitamin D3, (VITAMIN D3 ORAL) Take by mouth.    clobetasol (Temovate) 0.05 % external solution 2 times daily    COQ10, UBIQUINOL, ORAL     multivit-mineral-iron-lutein tablet     omega-3 fatty acids (FISH OIL CONCENTRATE ORAL) Take by mouth.       Medications and Supplements  prescribed by me and other practitioners or clinical pharmacist (such as prescriptions, OTC's, herbal therapies and supplements)  were reviewed and documented in the medical record.      Active Problem List  Patient Active Problem List   Diagnosis    Bilateral epiretinal membrane    Peripheral drusen of both eyes    Malignant neoplasm of uterus, unspecified site (Multi)       Comprehensive Medical/Surgical/Social/Family History  Past Medical History:   Diagnosis Date    Acne as a teen    Acute pharyngitis, unspecified 07/06/2018    Pharyngitis    Acute pharyngitis, unspecified 07/06/2018    Sore throat    Acute pharyngitis, unspecified 07/06/2018    Sore throat    Acute pharyngitis, unspecified 07/06/2018    Pharyngitis    Benign neoplasm of colon, unspecified 07/06/2018    Tubular adenoma of colon    Benign neoplasm of colon, unspecified 07/06/2018    Tubular adenoma of colon    Combined forms of age-related cataract, left eye 07/06/2018    Combined form of age-related cataract, left eye    Combined forms of age-related cataract, right eye 07/06/2018    Combined form of age-related cataract, right eye    Cough, unspecified 07/06/2018    Cough    Cough, unspecified 07/06/2018    Cough    Disorder of the skin and subcutaneous tissue, unspecified 07/06/2018    Changing skin lesion    Disorder of the skin and subcutaneous tissue, unspecified 07/06/2018    Changing skin lesion    Encounter for immunization 07/06/2018    Need for pneumococcal vaccination    Encounter for immunization 07/06/2018    Need for pneumococcal vaccination    Encounter for screening for malignant neoplasm of cervix 07/06/2018    Cervical cancer screening    Encounter for screening for malignant neoplasm of cervix 07/06/2018    Cervical cancer screening    Encounter for screening for malignant neoplasm of colon 07/06/2018    Colon cancer screening    Encounter for screening for malignant neoplasm of colon 07/06/2018    Colon cancer screening    Encounter for screening mammogram for malignant neoplasm of breast 07/06/2018    Visit for screening mammogram    Encounter for  screening mammogram for malignant neoplasm of breast 07/06/2018    Visit for screening mammogram    Epiretinal membrane (ERM), bilateral     Hyperlipidemia, unspecified 07/06/2018    Hyperlipidemia    Hyperlipidemia, unspecified 07/06/2018    Hyperlipidemia    Malignant neoplasm of uterus, part unspecified (Multi)     Mucinous adenocarcinoma of uterus    Other vitreous opacities, unspecified eye 09/23/2014    Floaters    Pain in left shoulder 07/06/2018    Left shoulder pain    Pain in left shoulder 07/06/2018    Left shoulder pain    Personal history of other endocrine, nutritional and metabolic disease     History of hypercholesterolemia    Precordial pain 07/06/2018    Chest pain, precordial    Precordial pain 07/06/2018    Chest pain, precordial    PVD (posterior vitreous detachment), right eye     Unspecified disorder of refraction 07/06/2018    Refraction error    Unspecified injury of head, initial encounter 07/06/2018    Acute head injury, initial encounter    Unspecified injury of head, initial encounter 07/06/2018    Acute head injury, initial encounter    Vitamin D deficiency, unspecified 07/06/2018    Vitamin D deficiency    Vitamin D deficiency, unspecified 07/06/2018    Vitamin D deficiency    Vitreous degeneration, right eye 07/06/2018    PVD (posterior vitreous detachment), right eye     Past Surgical History:   Procedure Laterality Date    COLONOSCOPY W/ POLYPECTOMY  09/12/2015    Complete Colonoscopy For Polyp Removal    HYSTERECTOMY  09/23/2014    Hysterectomy    OTHER SURGICAL HISTORY  09/23/2014    Venous Ligation With Stripping    OTHER SURGICAL HISTORY  09/12/2015    Salpingo-oophorect Bilat W/ Omentect, Total Abd Hysterect, Radical Dissect    TONSILLECTOMY  09/23/2014    Tonsillectomy     Social History     Social History Narrative    Not on file         Tobacco/Alcohol/Opioid use, as well as Illicit Drug Use was screened for/reviewed and documented in Social Documentation section of the  chart and medication list as appropriate     Depression Screening  Depression screening completed using the PHQ-2 questions, with results documented in the chart/encounter (5 min spent on this).  (See Rooming Screening section for documentation, and/or progress note for additional information)    Cardiac Risk Assessment (15 minutes spent on this)  The 10-year ASCVD risk score (Vineet BOONE, et al., 2019) is: 9.6%    Values used to calculate the score:      Age: 74 years      Sex: Female      Is Non- : No      Diabetic: No      Tobacco smoker: No      Systolic Blood Pressure: 120 mmHg      Is BP treated: No      HDL Cholesterol: 73.1 mg/dL      Total Cholesterol: 158 mg/dL    Cardiovascular risk was discussed and, if needed, lifestyle modifications recommended, including nutritional choices, exercise, and elimination of habits contributing to risk. We agreed on a plan to reduce the current cardiovascular risk based on above discussion as needed.     Aspirin use/disuse was discussed and documented in the Problem List of the medical record (under Cardiac Risk Counseling) after reviewing the updated guidelines below:  Consider low dose Aspirin ( mg) use if the benefit for cardiovascular disease prevention outweighs risk for bleeding complications.   Discussed that in general, low dose ASA should be considered:  In patients WITHOUT prior MI/stroke/PAD (primary prevention):   a. Age <60: Use if 10-year cardiovascular disease risk >20%, with discussion of risks and benefits with patient  b. Age 60-<70: Use if 10-year cardiovascular disease risk >20% and low bleeding (e.g., gastrointestinal) risk, with discussion of risks and benefits with patient  c. Age >=70: Do not use    In patients WITH prior MI/stroke/PAD (secondary prevention):   Generally use unless extremely high bleeding (e.g., gastrointenstinal) risk, with discussion of risks and benefits with patient    Advance Directives  Discussion  Advanced Care Planning (including a Living Will, Healthcare POA, as well as specific end of life choices and/or directives), was discussed with the patient and/or surrogate, voluntarily, and details of that discussion documented in the Problem List (under Advanced Directives Discussion) of the medical record.   (16 min spent discussing above)     During the course of the visit the patient was educated and counseled about age appropriate screening and preventive services.   Completed preventive screenings were documented in the chart (see Routine Health Maintenance in Problem List) and orders were placed for outstanding screenings/procedures as documented in the Assessment and Plan.    Patient Instructions (the written plan) was given to the patient at check out as part of the AVS.

## 2024-12-09 ENCOUNTER — APPOINTMENT (OUTPATIENT)
Dept: RADIOLOGY | Facility: CLINIC | Age: 74
End: 2024-12-09
Payer: MEDICARE

## 2024-12-10 ENCOUNTER — HOSPITAL ENCOUNTER (OUTPATIENT)
Dept: RADIOLOGY | Facility: CLINIC | Age: 74
Discharge: HOME | End: 2024-12-10
Payer: MEDICARE

## 2024-12-10 DIAGNOSIS — Z78.0 POST-MENOPAUSAL: ICD-10-CM

## 2024-12-10 DIAGNOSIS — M85.80 OSTEOPENIA, UNSPECIFIED LOCATION: ICD-10-CM

## 2024-12-10 PROCEDURE — 77080 DXA BONE DENSITY AXIAL: CPT

## 2024-12-10 PROCEDURE — 77080 DXA BONE DENSITY AXIAL: CPT | Performed by: RADIOLOGY

## 2025-02-11 DIAGNOSIS — E78.5 DYSLIPIDEMIA: ICD-10-CM

## 2025-02-11 RX ORDER — ATORVASTATIN CALCIUM 20 MG/1
20 TABLET, FILM COATED ORAL DAILY
Qty: 90 TABLET | Refills: 0 | Status: SHIPPED | OUTPATIENT
Start: 2025-02-11

## 2025-03-14 ENCOUNTER — APPOINTMENT (OUTPATIENT)
Dept: OPHTHALMOLOGY | Facility: CLINIC | Age: 75
End: 2025-03-14
Payer: MEDICARE

## 2025-03-14 DIAGNOSIS — H35.373 BILATERAL EPIRETINAL MEMBRANE: Primary | ICD-10-CM

## 2025-03-14 ASSESSMENT — ENCOUNTER SYMPTOMS
CARDIOVASCULAR NEGATIVE: 0
PSYCHIATRIC NEGATIVE: 0
RESPIRATORY NEGATIVE: 0
EYES NEGATIVE: 1
NEUROLOGICAL NEGATIVE: 0
GASTROINTESTINAL NEGATIVE: 0
ALLERGIC/IMMUNOLOGIC NEGATIVE: 0
HEMATOLOGIC/LYMPHATIC NEGATIVE: 0
MUSCULOSKELETAL NEGATIVE: 0
ENDOCRINE NEGATIVE: 0
CONSTITUTIONAL NEGATIVE: 0

## 2025-03-14 ASSESSMENT — CUP TO DISC RATIO
OS_RATIO: .3
OD_RATIO: .3

## 2025-03-14 ASSESSMENT — EXTERNAL EXAM - LEFT EYE: OS_EXAM: NORMAL

## 2025-03-14 ASSESSMENT — SLIT LAMP EXAM - LIDS
COMMENTS: GOOD POSITION
COMMENTS: GOOD POSITION

## 2025-03-14 ASSESSMENT — EXTERNAL EXAM - RIGHT EYE: OD_EXAM: NORMAL

## 2025-03-14 ASSESSMENT — TONOMETRY
OD_IOP_MMHG: 13
IOP_METHOD: GOLDMANN APPLANATION
OS_IOP_MMHG: 13

## 2025-03-14 ASSESSMENT — VISUAL ACUITY
CORRECTION_TYPE: GLASSES
OD_CC: 20/20
METHOD: SNELLEN - LINEAR
OS_CC: 20/25

## 2025-03-14 NOTE — PROGRESS NOTES
Epiretinal membrane (ERM) of both eyesH35.373  - Here today for follow up. Patient is reporting mild metamorphopsia OS only but states that it is stable and not affecting her ADLs.    - Today on exam patient has stable ERMs, (OS > OD). Vision remains excellent at 20/20 OD and 20/25 OS.    OCT : 03/14/25   - OD: ERM, abnromal foveal contour, inner reitnal thickening, EZ line preserved, (-) IRF/subretinal fluid (SRF); stable   - OS: ERM (OS > OD), abnromal foveal contour, inner reitnal thickening, EZ line preserved, (-) IRF/subretinal fluid (SRF): stable    Plan  - Will monitor for  now, discussed the need for potential surgery in the future if vision declines or symptoms worsen  - RTC 6 months    #Peripheral Drusen  - no evidence of cnvm, few central drusen but not apparent on OCT  - not consistent with AMD, will monitor for now

## 2025-04-09 ENCOUNTER — APPOINTMENT (OUTPATIENT)
Dept: DERMATOLOGY | Facility: CLINIC | Age: 75
End: 2025-04-09
Payer: MEDICARE

## 2025-04-09 DIAGNOSIS — L66.12 FRONTAL FIBROSING ALOPECIA: ICD-10-CM

## 2025-04-09 DIAGNOSIS — L81.4 LENTIGO: ICD-10-CM

## 2025-04-09 DIAGNOSIS — L82.1 SEBORRHEIC KERATOSIS: ICD-10-CM

## 2025-04-09 DIAGNOSIS — D18.01 HEMANGIOMA OF SKIN: ICD-10-CM

## 2025-04-09 DIAGNOSIS — Z12.83 ENCOUNTER FOR SCREENING FOR MALIGNANT NEOPLASM OF SKIN: ICD-10-CM

## 2025-04-09 DIAGNOSIS — D22.9 MELANOCYTIC NEVUS, UNSPECIFIED LOCATION: Primary | ICD-10-CM

## 2025-04-09 PROCEDURE — 1123F ACP DISCUSS/DSCN MKR DOCD: CPT | Performed by: DERMATOLOGY

## 2025-04-09 PROCEDURE — 11900 INJECT SKIN LESIONS </W 7: CPT | Performed by: DERMATOLOGY

## 2025-04-09 PROCEDURE — 99213 OFFICE O/P EST LOW 20 MIN: CPT | Performed by: DERMATOLOGY

## 2025-04-09 PROCEDURE — 1159F MED LIST DOCD IN RCRD: CPT | Performed by: DERMATOLOGY

## 2025-04-09 RX ORDER — CLOBETASOL PROPIONATE 0.5 MG/ML
SOLUTION TOPICAL 2 TIMES DAILY
Qty: 50 ML | Refills: 11 | Status: SHIPPED | OUTPATIENT
Start: 2025-04-09

## 2025-04-09 NOTE — PROGRESS NOTES
Subjective     Bridget Palomares is a 74 y.o. female who presents for the following: Alopecia (Frontal Fibrosing Alopecia. Pt has been using clobetasol solution 0.05% PRN. Pt is seeing good improvements with medication. Pt will need a refill ) and Skin Check (FBSE. Hx of AK.. No area of concern.).     Skin Cancer Screening  She has a history of moderate sun exposure. She is in the sun daily. She uses sunscreen daily. She reports no skin symptoms. Her moles are not changing.    Spots that concern her: none    Alopecia  She complains of hair loss. The hair loss is localized in distribution, with onset approximately several years ago. She describes symptoms of hair shedding, scalp itch, and scalp scaling. She denies hair breaking, hair thinning, scalp pain, scalp rash/ lesions, scalp redness, and scalp tenderness. She does not have family history of hair loss. She does not have dietary restrictions. She does not wear a high tension hair style. She had no serious medical illnesses or major weight loss during time of hair loss.     Review of Systems:  No other skin or systemic complaints other than what is documented elsewhere in the note.    The following portions of the chart were reviewed this encounter and updated as appropriate:       Skin Cancer History  No skin cancer on file.    Specialty Problems    None    Past Medical History:  Bridget Palomares  has a past medical history of Acne (as a teen), Acute pharyngitis, unspecified (07/06/2018), Acute pharyngitis, unspecified (07/06/2018), Acute pharyngitis, unspecified (07/06/2018), Acute pharyngitis, unspecified (07/06/2018), Benign neoplasm of colon, unspecified (07/06/2018), Benign neoplasm of colon, unspecified (07/06/2018), Combined forms of age-related cataract, left eye (07/06/2018), Combined forms of age-related cataract, right eye (07/06/2018), Cough, unspecified (07/06/2018), Cough, unspecified (07/06/2018), Disorder of the skin and subcutaneous  tissue, unspecified (07/06/2018), Disorder of the skin and subcutaneous tissue, unspecified (07/06/2018), Encounter for immunization (07/06/2018), Encounter for immunization (07/06/2018), Encounter for screening for malignant neoplasm of cervix (07/06/2018), Encounter for screening for malignant neoplasm of cervix (07/06/2018), Encounter for screening for malignant neoplasm of colon (07/06/2018), Encounter for screening for malignant neoplasm of colon (07/06/2018), Encounter for screening mammogram for malignant neoplasm of breast (07/06/2018), Encounter for screening mammogram for malignant neoplasm of breast (07/06/2018), Epiretinal membrane (ERM), bilateral, Hyperlipidemia, unspecified (07/06/2018), Hyperlipidemia, unspecified (07/06/2018), Malignant neoplasm of uterus, part unspecified (Multi), Other vitreous opacities, unspecified eye (09/23/2014), Pain in left shoulder (07/06/2018), Pain in left shoulder (07/06/2018), Personal history of other endocrine, nutritional and metabolic disease, Precordial pain (07/06/2018), Precordial pain (07/06/2018), PVD (posterior vitreous detachment), right eye, Unspecified disorder of refraction (07/06/2018), Unspecified injury of head, initial encounter (07/06/2018), Unspecified injury of head, initial encounter (07/06/2018), Vitamin D deficiency, unspecified (07/06/2018), Vitamin D deficiency, unspecified (07/06/2018), and Vitreous degeneration, right eye (07/06/2018).    Past Surgical History:  Bridget Palomares  has a past surgical history that includes Other surgical history (09/23/2014); Hysterectomy (09/23/2014); Tonsillectomy (09/23/2014); Colonoscopy w/ polypectomy (09/12/2015); Other surgical history (09/12/2015); and Total abdominal hysterectomy.    Family History:  Patient family history includes Acne in her brother; Arthritis in her father and mother; Cancer in her brother, mother, paternal grandfather, and paternal grandmother; Diabetes in her father and  father's brother; Hypertension in her brother, father, and mother; Skin cancer in her brother and mother; Stroke in her brother, father, and paternal grandfather.       Objective   Well appearing patient in no apparent distress; mood and affect are within normal limits.    A full examination was performed including scalp, head, eyes, ears, nose, lips, neck, chest, axillae, abdomen, back, buttocks, bilateral upper extremities, bilateral lower extremities, hands, feet, fingers, toes, fingernails, and toenails. All findings within normal limits unless otherwise noted below.    Assessment/Plan   1. Melanocytic nevus, unspecified location  All nevi were symmetric brown macules without atypia on dermoscopy.     The ABCDEs of melanoma and warning signs of non-melanoma skin cancer were discussed with patient and patient expressed understanding. Sun protection and use of at least SPF 30 discussed with patient. Pt instructed to reapply every 2 hours.     2. Encounter for screening for malignant neoplasm of skin    3. Hemangioma of skin  Scattered cherry-red papule(s).    4. Lentigo  Scattered tan macules in sun-exposed areas.    The ABCDEs of melanoma and warning signs of non-melanoma skin cancer were discussed with patient and patient expressed understanding. Sun protection and use of at least SPF 30 discussed with patient. Pt instructed to reapply every 2 hours.     5. Seborrheic keratosis  Stuck on verrucous, tan-brown papules and plaques.      6. Frontal fibrosing alopecia  Scalp  Patient has slight flare on L frontal scalp and R posterior scalp but has not been using clobetasol to those areas/feels that areas are still responsive.     Plan to continue clobetasol to these bassam BID x 1-2 weeks then PRN and recheck in 6 months + FBSE; pt to call if flaring and we will consider ILK injections. Risks, benefits, side effects, alternatives and options were discussed with patient and the patient voiced understanding. Pt agrees  with plan as above.       Related Medications  triamcinolone acetonide (Kenalog) injection 10 mg      clobetasol (Temovate) 0.05 % external solution  Apply topically 2 times a day.      Follow up 3-4 months or sooner as needed

## 2025-04-30 DIAGNOSIS — Z12.11 COLON CANCER SCREENING: ICD-10-CM

## 2025-05-01 RX ORDER — SODIUM, POTASSIUM,MAG SULFATES 17.5-3.13G
1 SOLUTION, RECONSTITUTED, ORAL ORAL EVERY 12 HOURS
Qty: 2 EACH | Refills: 0 | Status: SHIPPED | OUTPATIENT
Start: 2025-05-01

## 2025-06-01 DIAGNOSIS — E78.5 DYSLIPIDEMIA: ICD-10-CM

## 2025-06-02 RX ORDER — ATORVASTATIN CALCIUM 20 MG/1
20 TABLET, FILM COATED ORAL DAILY
Qty: 90 TABLET | Refills: 0 | Status: SHIPPED | OUTPATIENT
Start: 2025-06-02

## 2025-06-19 ENCOUNTER — ANESTHESIA EVENT (OUTPATIENT)
Dept: GASTROENTEROLOGY | Facility: EXTERNAL LOCATION | Age: 75
End: 2025-06-19

## 2025-07-02 ENCOUNTER — APPOINTMENT (OUTPATIENT)
Dept: GASTROENTEROLOGY | Facility: EXTERNAL LOCATION | Age: 75
End: 2025-07-02
Payer: MEDICARE

## 2025-07-02 ENCOUNTER — ANESTHESIA (OUTPATIENT)
Dept: GASTROENTEROLOGY | Facility: EXTERNAL LOCATION | Age: 75
End: 2025-07-02

## 2025-07-02 VITALS
RESPIRATION RATE: 18 BRPM | HEART RATE: 70 BPM | DIASTOLIC BLOOD PRESSURE: 75 MMHG | HEIGHT: 63 IN | SYSTOLIC BLOOD PRESSURE: 118 MMHG | OXYGEN SATURATION: 97 % | WEIGHT: 155 LBS | TEMPERATURE: 98.1 F | BODY MASS INDEX: 27.46 KG/M2

## 2025-07-02 DIAGNOSIS — Z80.0 FAMILY HISTORY OF COLON CANCER: ICD-10-CM

## 2025-07-02 PROCEDURE — G0105 COLORECTAL SCRN; HI RISK IND: HCPCS | Performed by: INTERNAL MEDICINE

## 2025-07-02 RX ORDER — SODIUM CHLORIDE 9 MG/ML
INJECTION, SOLUTION INTRAVENOUS CONTINUOUS PRN
Status: DISCONTINUED | OUTPATIENT
Start: 2025-07-02 | End: 2025-07-02

## 2025-07-02 RX ORDER — ONDANSETRON HYDROCHLORIDE 2 MG/ML
4 INJECTION, SOLUTION INTRAVENOUS ONCE AS NEEDED
Status: DISCONTINUED | OUTPATIENT
Start: 2025-07-02 | End: 2025-07-03 | Stop reason: HOSPADM

## 2025-07-02 RX ORDER — PROPOFOL 10 MG/ML
INJECTION, EMULSION INTRAVENOUS AS NEEDED
Status: DISCONTINUED | OUTPATIENT
Start: 2025-07-02 | End: 2025-07-02

## 2025-07-02 RX ORDER — LIDOCAINE HYDROCHLORIDE 20 MG/ML
INJECTION, SOLUTION INFILTRATION; PERINEURAL AS NEEDED
Status: DISCONTINUED | OUTPATIENT
Start: 2025-07-02 | End: 2025-07-02

## 2025-07-02 RX ADMIN — PROPOFOL 100 MG: 10 INJECTION, EMULSION INTRAVENOUS at 10:23

## 2025-07-02 RX ADMIN — SODIUM CHLORIDE: 9 INJECTION, SOLUTION INTRAVENOUS at 10:17

## 2025-07-02 RX ADMIN — LIDOCAINE HYDROCHLORIDE 3 ML: 20 INJECTION, SOLUTION INFILTRATION; PERINEURAL at 10:23

## 2025-07-02 RX ADMIN — PROPOFOL 50 MG: 10 INJECTION, EMULSION INTRAVENOUS at 10:35

## 2025-07-02 SDOH — HEALTH STABILITY: MENTAL HEALTH: CURRENT SMOKER: 0

## 2025-07-02 ASSESSMENT — PAIN SCALES - GENERAL
PAINLEVEL_OUTOF10: 0 - NO PAIN
PAIN_LEVEL: 0
PAINLEVEL_OUTOF10: 0 - NO PAIN

## 2025-07-02 ASSESSMENT — COLUMBIA-SUICIDE SEVERITY RATING SCALE - C-SSRS
2. HAVE YOU ACTUALLY HAD ANY THOUGHTS OF KILLING YOURSELF?: NO
6. HAVE YOU EVER DONE ANYTHING, STARTED TO DO ANYTHING, OR PREPARED TO DO ANYTHING TO END YOUR LIFE?: NO
1. IN THE PAST MONTH, HAVE YOU WISHED YOU WERE DEAD OR WISHED YOU COULD GO TO SLEEP AND NOT WAKE UP?: NO

## 2025-07-02 ASSESSMENT — PAIN - FUNCTIONAL ASSESSMENT
PAIN_FUNCTIONAL_ASSESSMENT: 0-10

## 2025-07-02 NOTE — H&P
Outpatient Hospital Procedure    Patient Profile-Procedures  Initial Info  Patient Demographics  Name Bridget Palomares  Date of Birth 1950  MRN [id number]  Address   [address] [facility] [id number] [facility] [address]    Primary Phone Number [phone]  Secondary Phone Number    Jeanette Allen    Procedures   Colonoscopy      Indication:  Surveillance - TA 2022    Anesthesia Indication: anticipated intolerance to moderate sedation    Primary contact name and number   Extended Emergency Contact Information  Primary Emergency Contact: Hudson Palomares   [facility]  Home Phone: [phone]  Work Phone: [phone]  Mobile Phone: [phone]  Relation: Spouse    General Health  Weight   Vitals:    07/02/25 1004   Weight: 70.3 kg (155 lb)     BMI Body mass index is 27.9 kg/m².    Allergies  RX Allergies[1]    Past Medical History   Medical History[2]    Provider assessment  Diagnosis  Medication Reviewed - yes  Prior to Admission medications    Medication Sig Start Date End Date Taking? Authorizing Provider   aspirin 81 mg EC tablet Take 1 tablet (81 mg) by mouth once daily. 8/8/14  Yes Historical Provider, MD   atorvastatin (Lipitor) 20 mg tablet TAKE ONE TABLET BY MOUTH EVERY DAY 6/2/25  Yes Renetta Eller MD   cholecalciferol, vitamin D3, (VITAMIN D3 ORAL) Take by mouth.   Yes Historical Provider, MD   COQ10, UBIQUINOL, ORAL    Yes Historical Provider, MD   multivit-mineral-iron-lutein tablet    Yes Historical Provider, MD   omega-3 fatty acids (FISH OIL CONCENTRATE ORAL) Take by mouth.   Yes Historical Provider, MD   sodium,potassium,mag sulfates (Suprep Bowel Prep Kit) 17.5-3.13-1.6 gram solution Take 1 bottle by mouth every 12 hours. 5/1/25 7/2/25 Yes Agnes CAMPOS MD   clobetasol (Temovate) 0.05 % external solution Apply topically 2 times a day. 4/9/25   Fanny Bahena MD       This is my H&P    Physical Exam  Physical Exam  Constitutional:        Comments: Awake   HENT:      Head: Normocephalic.   Cardiovascular:      Rate and Rhythm: Normal rate and regular rhythm.   Pulmonary:      Effort: Pulmonary effort is normal.      Breath sounds: Normal breath sounds.   Abdominal:      General: Bowel sounds are normal.      Palpations: Abdomen is soft.   Neurological:      Mental Status: She is alert.   Psychiatric:         Mood and Affect: Mood normal.           Oropharyngeal Classification II (hard and soft palate, upper portion of tonsils and uvula visible)  ASA PS Classification 2  Sedation Plan Deep  Procedure Plan - pre-procedural (re)assesment completed by physician:  discharge/transfer patient when discharge criteria met    Agnes Damian MD  7/2/2025 10:19 AM           [1]   Allergies  Allergen Reactions    Tomato Cough   [2]   Past Medical History:  Diagnosis Date    Acne as a teen    Acute pharyngitis, unspecified 07/06/2018    Pharyngitis    Acute pharyngitis, unspecified 07/06/2018    Sore throat    Acute pharyngitis, unspecified 07/06/2018    Sore throat    Acute pharyngitis, unspecified 07/06/2018    Pharyngitis    Benign neoplasm of colon, unspecified 07/06/2018    Tubular adenoma of colon    Benign neoplasm of colon, unspecified 07/06/2018    Tubular adenoma of colon    Cholelithiasis May 2016, had laparoscopic cholecystectomy June 2016    Colon polyp with past colonoscopies    Combined forms of age-related cataract, left eye 07/06/2018    Combined form of age-related cataract, left eye    Combined forms of age-related cataract, right eye 07/06/2018    Combined form of age-related cataract, right eye    Cough, unspecified 07/06/2018    Cough    Cough, unspecified 07/06/2018    Cough    Disorder of the skin and subcutaneous tissue, unspecified 07/06/2018    Changing skin lesion    Disorder of the skin and subcutaneous tissue, unspecified 07/06/2018    Changing skin lesion    Diverticulosis with past colonoscopy    Encounter for immunization  07/06/2018    Need for pneumococcal vaccination    Encounter for immunization 07/06/2018    Need for pneumococcal vaccination    Encounter for screening for malignant neoplasm of cervix 07/06/2018    Cervical cancer screening    Encounter for screening for malignant neoplasm of cervix 07/06/2018    Cervical cancer screening    Encounter for screening for malignant neoplasm of colon 07/06/2018    Colon cancer screening    Encounter for screening for malignant neoplasm of colon 07/06/2018    Colon cancer screening    Encounter for screening mammogram for malignant neoplasm of breast 07/06/2018    Visit for screening mammogram    Encounter for screening mammogram for malignant neoplasm of breast 07/06/2018    Visit for screening mammogram    Epiretinal membrane (ERM), bilateral     Hyperlipidemia, unspecified 07/06/2018    Hyperlipidemia    Hyperlipidemia, unspecified 07/06/2018    Hyperlipidemia    Malignant neoplasm of uterus, part unspecified (Multi)     Mucinous adenocarcinoma of uterus    Other vitreous opacities, unspecified eye 09/23/2014    Floaters    Pain in left shoulder 07/06/2018    Left shoulder pain    Pain in left shoulder 07/06/2018    Left shoulder pain    Personal history of other endocrine, nutritional and metabolic disease     History of hypercholesterolemia    Precordial pain 07/06/2018    Chest pain, precordial    Precordial pain 07/06/2018    Chest pain, precordial    PVD (posterior vitreous detachment), right eye     Unspecified disorder of refraction 07/06/2018    Refraction error    Unspecified injury of head, initial encounter 07/06/2018    Acute head injury, initial encounter    Unspecified injury of head, initial encounter 07/06/2018    Acute head injury, initial encounter    Vitamin D deficiency, unspecified 07/06/2018    Vitamin D deficiency    Vitamin D deficiency, unspecified 07/06/2018    Vitamin D deficiency    Vitreous degeneration, right eye 07/06/2018    PVD (posterior vitreous  detachment), right eye

## 2025-07-02 NOTE — DISCHARGE INSTRUCTIONS
Patient Instructions Post Endoscopy Procedure      The anesthetics, sedatives or narcotics which were given to you today will be acting in your body for the next 24 hours, so you might feel a little sleepy or groggy.  This feeling should slowly wear off. Carefully read and follow the instructions.     You received sedation today:  - Do not drive or operate any machinery or power tools of any kind.   - No alcoholic beverages today, not even beer or wine.  - Do not make any important decisions or sign any legal documents.  - No over the counter medications that contain alcohol or that may cause drowsiness.    While it is common to experience mild to moderate abdominal distention, gas, or belching after your procedure, if any of these symptoms occur following discharge from the GI Lab or within one week of having your procedure, call the [facility] to be advised whether a visit to your nearest Urgent Care or Emergency Department is indicated.  Take this paper with you if you go.   - If you develop an allergic reaction to the medications that were given during your procedure such as difficulty breathing, rash, hives, severe nausea, vomiting or lightheadedness.  - If you experience chest pain, shortness of breath, severe abdominal pain, fevers and chills.  -If you develop signs and symptoms of bleeding such as blood in your spit, if your stools turn black, tarry, or bloody  - If you have not urinated within 8 hours following your procedure.  - If your IV site becomes painful, red, inflamed, or looks infected.        Your physician recommends the additional following instructions:    -You have a contact number available for emergencies. The signs and symptoms of potential delayed complications were discussed with you. You may return to normal activities tomorrow.  -Resume your previous diet or other if specified.  -Continue your present medications.   -We are waiting for your pathology results, if  applicable. The results will be available in [facility]. I will send you a message with any recommendations.  -The findings and recommendations have been discussed with you and/or family.  -Please see Medication Reconciliation Form for new medication/medications prescribed.     If you experience any problems or have any questions following discharge from the GI Lab, please call: [phone] from 7 am- 4:30 pm.  In the event of an emergency please go to the closest Emergency Department or call Dr. Damian at [phone]

## 2025-07-02 NOTE — ANESTHESIA POSTPROCEDURE EVALUATION
"Patient: Bridgte Palomares \"Bridget Abdi\"    Procedure Summary       Date: 07/02/25 Room / Location: [facility]    Anesthesia Start: 1021 Anesthesia Stop: 1044    Procedure: COLONOSCOPY Diagnosis: Family history of colon cancer    Scheduled Providers: Agnes CAMPOS MD Responsible Provider: ANIA Dickinson    Anesthesia Type: MAC ASA Status: 2            Anesthesia Type: MAC    Vitals Value Taken Time   /72 07/02/25 10:41   Temp 36.7 °C (98.1 °F) 07/02/25 10:41   Pulse 71 07/02/25 10:41   Resp 17 07/02/25 10:41   SpO2 99 % 07/02/25 10:41       Anesthesia Post Evaluation    Patient location during evaluation: bedside  Patient participation: complete - patient participated  Level of consciousness: awake  Pain score: 0  Pain management: adequate  Airway patency: patent  Cardiovascular status: acceptable  Respiratory status: acceptable  Hydration status: acceptable  Postoperative Nausea and Vomiting: none        There were no known notable events for this encounter.    "

## 2025-07-02 NOTE — ANESTHESIA PREPROCEDURE EVALUATION
"Patient: Bridget Palomares \"Bridget Abdi\"    Procedure Information       Date/Time: 07/02/25 1000    Scheduled providers: Agnes CAMPOS MD    Procedure: COLONOSCOPY    Location: [facility]            Relevant Problems   GYN   (+) Malignant neoplasm of uterus, unspecified site (Multi)       Clinical information reviewed:   Tobacco  Allergies  Meds   Med Hx  Surg Hx   Fam Hx  Soc Hx        NPO Detail:  NPO/Void Status  Carbohydrate Drink Given Prior to Surgery? : Y  Date of Last Liquid: 07/01/25  Time of Last Liquid: 2200  Date of Last Solid: 06/30/25  Time of Last Solid: 1800  Last Intake Type: GI prep  Time of Last Void: 1006         Physical Exam    Airway  Mallampati: II  TM distance: >3 FB  Neck ROM: full  Mouth opening: 3 or more finger widths     Cardiovascular - normal exam  Rhythm: regular  Rate: normal     Dental - normal exam     Pulmonary - normal examBreath sounds clear to auscultation     Abdominal - normal exam           Anesthesia Plan    History of general anesthesia?: yes  History of complications of general anesthesia?: no    ASA 2     MAC     The patient is not a current smoker.    intravenous induction   Anesthetic plan and risks discussed with patient.    Plan discussed with CRNA.      "

## 2025-07-08 NOTE — PROGRESS NOTES
Subjective     Bridget Palomares is a 74 y.o. female who presents for the following: Alopecia (3-4 month follow up for frontal fibrosing alopecia. Pt has been using clobetasol 0.05% external solution. Pt reports condition is worse, increased hair loss. ).     Alopecia  She complains of hair loss. The hair loss is diffuse in distribution, with onset approximately 2 years ago. She describes symptoms of hair shedding, hair thinning, and scalp itch. She denies hair breaking and scalp pain. She does not have family history of hair loss. She does not have dietary restrictions. She does not wear a high tension hair style. She had no serious medical illnesses or major weight loss during time of hair loss.       Review of Systems:  No other skin or systemic complaints other than what is documented elsewhere in the note.    The following portions of the chart were reviewed this encounter and updated as appropriate:       Skin Cancer History  Biopsy Log Book  No skin cancers from Specimen Tracking.    Additional History      Specialty Problems    None    Past Medical History:  Birdget Palomares  has a past medical history of Acne (as a teen), Acute pharyngitis, unspecified (07/06/2018), Acute pharyngitis, unspecified (07/06/2018), Acute pharyngitis, unspecified (07/06/2018), Acute pharyngitis, unspecified (07/06/2018), Benign neoplasm of colon, unspecified (07/06/2018), Benign neoplasm of colon, unspecified (07/06/2018), Cholelithiasis (May 2016, had laparoscopic cholecystectomy June 2016), Colon polyp (with past colonoscopies), Combined forms of age-related cataract, left eye (07/06/2018), Combined forms of age-related cataract, right eye (07/06/2018), Cough, unspecified (07/06/2018), Cough, unspecified (07/06/2018), Disorder of the skin and subcutaneous tissue, unspecified (07/06/2018), Disorder of the skin and subcutaneous tissue, unspecified (07/06/2018), Diverticulosis (with past colonoscopy), Encounter for  immunization (07/06/2018), Encounter for immunization (07/06/2018), Encounter for screening for malignant neoplasm of cervix (07/06/2018), Encounter for screening for malignant neoplasm of cervix (07/06/2018), Encounter for screening for malignant neoplasm of colon (07/06/2018), Encounter for screening for malignant neoplasm of colon (07/06/2018), Encounter for screening mammogram for malignant neoplasm of breast (07/06/2018), Encounter for screening mammogram for malignant neoplasm of breast (07/06/2018), Epiretinal membrane (ERM), bilateral, Hyperlipidemia, unspecified (07/06/2018), Hyperlipidemia, unspecified (07/06/2018), Malignant neoplasm of uterus, part unspecified (Multi), Other vitreous opacities, unspecified eye (09/23/2014), Pain in left shoulder (07/06/2018), Pain in left shoulder (07/06/2018), Personal history of other endocrine, nutritional and metabolic disease, Precordial pain (07/06/2018), Precordial pain (07/06/2018), PVD (posterior vitreous detachment), right eye, Unspecified disorder of refraction (07/06/2018), Unspecified injury of head, initial encounter (07/06/2018), Unspecified injury of head, initial encounter (07/06/2018), Vitamin D deficiency, unspecified (07/06/2018), Vitamin D deficiency, unspecified (07/06/2018), and Vitreous degeneration, right eye (07/06/2018).    Past Surgical History:  Bridget Palomares  has a past surgical history that includes Other surgical history (09/23/2014); Hysterectomy (09/23/2014); Tonsillectomy (09/23/2014); Colonoscopy w/ polypectomy (09/12/2015); Other surgical history (09/12/2015); Total abdominal hysterectomy; and Cholecystectomy (June 2016).    Family History:  Patient family history includes Acne in her brother; Arthritis in her father and mother; Cancer in her brother, mother, paternal grandfather, and paternal grandmother; Colon cancer (age of onset: 60 - 69) in her brother and paternal grandfather; Colon polyps (age of onset: 50 - 59) in her  brother and father; Diabetes in her father and father's brother; Hyperlipidemia (age of onset: 40 - 49) in her brother, father, and mother; Hypertension in her brother, father, and mother; Skin cancer in her brother and mother; Stroke in her brother, father, and paternal grandfather.       Objective   Well appearing patient in no apparent distress; mood and affect are within normal limits.    A focused skin examination was performed. All findings within normal limits unless otherwise noted below.    Assessment/Plan   Skin Exam  1. DERMATITIS  Left Upper Back, Neck - Posterior, Right Upper Back  Scattered erythematous macules and papule with evidence of excoriation in mid-upper back.   Ddx: Dermatitis (unspecified) vs folliculitis  The nature of the diagnosis was explained to patient.    Plan to treat with triamcinolone 0.1% cream BID for 1 week.   triamcinolone (Kenalog) 0.1 % cream - Left Upper Back, Neck - Posterior, Right Upper Back  Apply topically 2 times a day for 14 days.  2. LICHEN PLANOPILARIS  Scalp  Anterior hairline with  Diminished density of hair follicles in anterior scalp. On anterior right scalp at temple there is perifollicular erythema.  Over crown of head there are 7-8 erythematous scaly plaques with minimal hair follicles. Hair follicles present in those areas with perifollicular erythema.   The nature of the diagnosis was explained to patient.    Kenalog injection: After discussing risks of depressed scarring, patient consent was obtained. 10 mg/ml Intralesional. 0.1 ml per injection site, 10 number of sites, and 1 ml-total volume used. Lot #: [id number] Expiration Date: 9/2027    Follow Up In Dermatology - Established Patient - Scalp  3. FRONTAL FIBROSING ALOPECIA      Related Medications  clobetasol (Temovate) 0.05 % external solution  Apply topically 2 times a day.  triamcinolone acetonide (Kenalog) injection 10 mg      Fanny Bahena MD   PGY2 Dermatology Resident  7/9/2025    I saw and  evaluated the patient. I personally obtained the key and critical portions of the history and physical exam or was physically present for key and critical portions performed by the student/resident. I reviewed the student/resident's documentation and discussed the patient with the student/resident. I was present for the entirety of any procedure(s). I agree with the student/resident's medical decision making as documented in the note.

## 2025-07-09 ENCOUNTER — APPOINTMENT (OUTPATIENT)
Dept: DERMATOLOGY | Facility: CLINIC | Age: 75
End: 2025-07-09
Payer: MEDICARE

## 2025-07-09 DIAGNOSIS — L66.12 FRONTAL FIBROSING ALOPECIA: ICD-10-CM

## 2025-07-09 DIAGNOSIS — L66.10 LICHEN PLANOPILARIS: ICD-10-CM

## 2025-07-09 DIAGNOSIS — L30.9 DERMATITIS: Primary | ICD-10-CM

## 2025-07-09 PROCEDURE — 11901 INJECT SKIN LESIONS >7: CPT

## 2025-07-09 PROCEDURE — 1159F MED LIST DOCD IN RCRD: CPT | Performed by: DERMATOLOGY

## 2025-07-09 PROCEDURE — 1036F TOBACCO NON-USER: CPT | Performed by: DERMATOLOGY

## 2025-07-09 PROCEDURE — 99214 OFFICE O/P EST MOD 30 MIN: CPT | Performed by: DERMATOLOGY

## 2025-07-09 RX ORDER — CLOBETASOL PROPIONATE 0.5 MG/ML
SOLUTION TOPICAL 2 TIMES DAILY
Qty: 100 ML | Refills: 11 | Status: SHIPPED | OUTPATIENT
Start: 2025-07-09

## 2025-07-09 RX ORDER — TRIAMCINOLONE ACETONIDE 1 MG/G
CREAM TOPICAL 2 TIMES DAILY
Qty: 30 G | Refills: 3 | Status: SHIPPED | OUTPATIENT
Start: 2025-07-09 | End: 2025-07-23

## 2025-07-09 NOTE — Clinical Note
Ddx: Dermatitis (unspecified) vs folliculitis  The nature of the diagnosis was explained to patient.    Plan to treat with triamcinolone 0.1% cream BID for 1 week.

## 2025-07-09 NOTE — Clinical Note
The nature of the diagnosis was explained to patient.    Kenalog injection: After discussing risks of depressed scarring, patient consent was obtained. 10 mg/ml Intralesional. 0.1 ml per injection site, 10 number of sites, and 1 ml-total volume used. Lot #: [id number] Expiration Date: 9/2027

## 2025-07-09 NOTE — Clinical Note
Anterior hairline with  Diminished density of hair follicles in anterior scalp. On anterior right scalp at temple there is perifollicular erythema.  Over crown of head there are 7-8 erythematous scaly plaques with minimal hair follicles. Hair follicles present in those areas with perifollicular erythema.

## 2025-08-11 ENCOUNTER — APPOINTMENT (OUTPATIENT)
Dept: DERMATOLOGY | Facility: CLINIC | Age: 75
End: 2025-08-11
Payer: MEDICARE

## 2025-08-11 DIAGNOSIS — L66.10 LICHEN PLANOPILARIS: Primary | ICD-10-CM

## 2025-08-11 PROCEDURE — 11901 INJECT SKIN LESIONS >7: CPT | Performed by: DERMATOLOGY

## 2025-08-11 PROCEDURE — 99213 OFFICE O/P EST LOW 20 MIN: CPT | Performed by: DERMATOLOGY

## 2025-08-11 PROCEDURE — 1036F TOBACCO NON-USER: CPT | Performed by: DERMATOLOGY

## 2025-08-11 PROCEDURE — 1159F MED LIST DOCD IN RCRD: CPT | Performed by: DERMATOLOGY

## 2025-09-03 DIAGNOSIS — E78.5 DYSLIPIDEMIA: ICD-10-CM

## 2025-09-04 RX ORDER — ATORVASTATIN CALCIUM 20 MG/1
20 TABLET, FILM COATED ORAL DAILY
Qty: 90 TABLET | Refills: 0 | Status: SHIPPED | OUTPATIENT
Start: 2025-09-04

## 2025-09-25 ENCOUNTER — APPOINTMENT (OUTPATIENT)
Dept: OPHTHALMOLOGY | Facility: CLINIC | Age: 75
End: 2025-09-25
Payer: MEDICARE

## 2025-11-20 ENCOUNTER — APPOINTMENT (OUTPATIENT)
Dept: PRIMARY CARE | Facility: CLINIC | Age: 75
End: 2025-11-20
Payer: MEDICARE